# Patient Record
Sex: FEMALE | Race: WHITE | Employment: FULL TIME | URBAN - METROPOLITAN AREA
[De-identification: names, ages, dates, MRNs, and addresses within clinical notes are randomized per-mention and may not be internally consistent; named-entity substitution may affect disease eponyms.]

---

## 2017-05-24 PROCEDURE — 88175 CYTOPATH C/V AUTO FLUID REDO: CPT | Performed by: OBSTETRICS & GYNECOLOGY

## 2017-05-24 PROCEDURE — 87624 HPV HI-RISK TYP POOLED RSLT: CPT | Performed by: OBSTETRICS & GYNECOLOGY

## 2017-08-13 ENCOUNTER — HOSPITAL ENCOUNTER (OUTPATIENT)
Age: 47
Discharge: HOME | End: 2017-08-13
Payer: COMMERCIAL

## 2017-08-13 VITALS
BODY MASS INDEX: 35.97 KG/M2 | HEIGHT: 61.5 IN | OXYGEN SATURATION: 99 % | WEIGHT: 193 LBS | RESPIRATION RATE: 18 BRPM | TEMPERATURE: 99 F | DIASTOLIC BLOOD PRESSURE: 95 MMHG | SYSTOLIC BLOOD PRESSURE: 149 MMHG | HEART RATE: 92 BPM

## 2017-08-13 DIAGNOSIS — M79.2 RADICULAR PAIN OF RIGHT UPPER EXTREMITY: ICD-10-CM

## 2017-08-13 DIAGNOSIS — S16.1XXA CERVICAL MUSCLE STRAIN, INITIAL ENCOUNTER: Primary | ICD-10-CM

## 2017-08-13 PROCEDURE — 96372 THER/PROPH/DIAG INJ SC/IM: CPT

## 2017-08-13 PROCEDURE — 99214 OFFICE O/P EST MOD 30 MIN: CPT

## 2017-08-13 PROCEDURE — 99204 OFFICE O/P NEW MOD 45 MIN: CPT

## 2017-08-13 RX ORDER — KETOROLAC TROMETHAMINE 30 MG/ML
60 INJECTION, SOLUTION INTRAMUSCULAR; INTRAVENOUS ONCE
Status: COMPLETED | OUTPATIENT
Start: 2017-08-13 | End: 2017-08-13

## 2017-08-13 RX ORDER — HYDROCODONE BITARTRATE AND ACETAMINOPHEN 5; 325 MG/1; MG/1
1 TABLET ORAL EVERY 6 HOURS PRN
Qty: 17 TABLET | Refills: 0 | Status: SHIPPED | OUTPATIENT
Start: 2017-08-13 | End: 2017-12-23 | Stop reason: ALTCHOICE

## 2017-08-13 RX ORDER — CYCLOBENZAPRINE HCL 5 MG
5 TABLET ORAL 3 TIMES DAILY PRN
Qty: 20 TABLET | Refills: 0 | Status: SHIPPED | OUTPATIENT
Start: 2017-08-13 | End: 2017-12-23 | Stop reason: ALTCHOICE

## 2017-08-13 RX ORDER — METHYLPREDNISOLONE 4 MG/1
TABLET ORAL
Qty: 1 PACKAGE | Refills: 0 | Status: SHIPPED | OUTPATIENT
Start: 2017-08-13 | End: 2017-12-23 | Stop reason: ALTCHOICE

## 2017-08-13 NOTE — ED NOTES
Pt instructed that she could take tylenol if she wasn't taking norco, she will check her B/P when calmer, and go to ED if headache worsens

## 2017-08-13 NOTE — ED PROVIDER NOTES
Patient Seen in: [facility]    History   Patient presents with:  Shoulder Pain    Stated Complaint: PINCHED NERVE/RT SHLDR, ARM PAIN X 3 DAYS    HPI    Glenndelmar Shipley is a 53-year-old female who comes in today complaining of axial neck Skin cancer   • Cancer Mother      Lung Cancer   • Hypertension Father    • High Cholesterol Paternal Grandmother    • Osteoporosis[Other] [OTHER] Mother    • Osteoporosis[Other] [OTHER] Maternal Grandmother    • Dementia Maternal Grandmother    • Breast and intact distal pulses. Pulmonary/Chest: Effort normal and breath sounds normal. No respiratory distress. She has no wheezes. She has no rales.    Musculoskeletal:        Right shoulder: Normal.        Cervical back: She exhibits decreased range of mot pain relief to start the Medrol Dosepak tomorrow with Flexeril and Norco as needed for pain if any numbness tingling bowel or bladder incontinence she should be reevaluated immediately    The patient is in good condition throughout her visit today and kai instructions and plan.

## 2017-08-13 NOTE — ED INITIAL ASSESSMENT (HPI)
Right shoulder pain x 3 days. Denies injury. Pt states hse woke with the pain radiating to elbow. Able to lift arm up to shoulder level. Took motrin 1-2 tabs as needed. Also applied icy hot. Denies nay swelling.

## 2017-08-13 NOTE — ED NOTES
Pt is getting a headache, and was wondering is it was normal, or her B/P. Pt denies any other symptoms.   She rates her headache as a 2/10

## 2017-12-17 ENCOUNTER — HOSPITAL ENCOUNTER (OUTPATIENT)
Age: 47
Discharge: ANOTHER HOSPITAL | End: 2017-12-17
Attending: FAMILY MEDICINE
Payer: COMMERCIAL

## 2017-12-17 ENCOUNTER — HOSPITAL ENCOUNTER (EMERGENCY)
Facility: HOSPITAL | Age: 47
Discharge: STILL ADMITTED OR ADMINISTRATIVE | End: 2017-12-19
Payer: COMMERCIAL

## 2017-12-17 VITALS
OXYGEN SATURATION: 100 % | SYSTOLIC BLOOD PRESSURE: 141 MMHG | TEMPERATURE: 98 F | HEART RATE: 80 BPM | RESPIRATION RATE: 16 BRPM | DIASTOLIC BLOOD PRESSURE: 80 MMHG

## 2017-12-17 DIAGNOSIS — M54.9 PAIN RADIATING TO BACK: ICD-10-CM

## 2017-12-17 DIAGNOSIS — R10.32 ABDOMINAL PAIN, LEFT LOWER QUADRANT: Primary | ICD-10-CM

## 2017-12-17 LAB
POCT BILIRUBIN URINE: NEGATIVE
POCT GLUCOSE URINE: NEGATIVE MG/DL
POCT KETONE URINE: 80 MG/DL
POCT NITRITE URINE: NEGATIVE
POCT PH URINE: 7.5 (ref 5–8)
POCT SPECIFIC GRAVITY URINE: 1.02
POCT URINE COLOR: YELLOW
POCT URINE PREGNANCY: NEGATIVE
POCT UROBILINOGEN URINE: 0.2 MG/DL

## 2017-12-17 PROCEDURE — 87077 CULTURE AEROBIC IDENTIFY: CPT | Performed by: FAMILY MEDICINE

## 2017-12-17 PROCEDURE — 81025 URINE PREGNANCY TEST: CPT | Performed by: FAMILY MEDICINE

## 2017-12-17 PROCEDURE — 87186 SC STD MICRODIL/AGAR DIL: CPT | Performed by: FAMILY MEDICINE

## 2017-12-17 PROCEDURE — 87086 URINE CULTURE/COLONY COUNT: CPT | Performed by: FAMILY MEDICINE

## 2017-12-17 PROCEDURE — 99215 OFFICE O/P EST HI 40 MIN: CPT

## 2017-12-17 PROCEDURE — 81002 URINALYSIS NONAUTO W/O SCOPE: CPT | Performed by: FAMILY MEDICINE

## 2017-12-17 RX ORDER — SODIUM CHLORIDE 9 MG/ML
1000 INJECTION, SOLUTION INTRAVENOUS ONCE
Status: COMPLETED | OUTPATIENT
Start: 2017-12-17 | End: 2017-12-17

## 2017-12-17 RX ORDER — ONDANSETRON 4 MG/1
4 TABLET, ORALLY DISINTEGRATING ORAL ONCE
Status: COMPLETED | OUTPATIENT
Start: 2017-12-17 | End: 2017-12-17

## 2017-12-17 RX ORDER — ONDANSETRON 4 MG/1
4 TABLET, ORALLY DISINTEGRATING ORAL ONCE
Status: DISCONTINUED | OUTPATIENT
Start: 2017-12-17 | End: 2017-12-17

## 2017-12-17 RX ORDER — SODIUM CHLORIDE 9 MG/ML
1000 INJECTION, SOLUTION INTRAVENOUS ONCE
Status: DISCONTINUED | OUTPATIENT
Start: 2017-12-17 | End: 2017-12-17

## 2017-12-17 NOTE — ED PROVIDER NOTES
Patient Seen in: [facility]    History   Patient presents with:  Abdominal Pain  Nausea    Stated Complaint: EXTREME abdominal pain radiating to back, today    HPI  Patient is a 18-year-old female coming in with complaints of ex 1615]  BP: 148/80  Pulse: 73  Resp: 16  Temp: 98 °F (36.7 °C)  Temp src: Oral  SpO2: 100 %  O2 Device: None (Room air)    Current:/80   Pulse 73   Temp 98 °F (36.7 °C) (Oral)   Resp 16   LMP 12/06/2017 (Exact Date)   SpO2 100%       Physical Exam  GE Go to ER now, do not stop  Remain n.p.o., avoid any consumption of food or liquids.    Transfer to the ER for further evaluation and higher level of care   IV access obtained - Fluids started   Zofran 4 mg ODT given while here in IC         Disposit

## 2017-12-17 NOTE — ED INITIAL ASSESSMENT (HPI)
Patient presents with left lower abdominal pain all day today. Pain is constant sharp in nature. No fever noted. Some nausea but not emesis. Small BM today

## 2017-12-23 PROBLEM — N20.0 KIDNEY STONE: Status: ACTIVE | Noted: 2017-12-23

## 2018-01-21 PROCEDURE — 87147 CULTURE TYPE IMMUNOLOGIC: CPT | Performed by: INTERNAL MEDICINE

## 2018-01-21 PROCEDURE — 87077 CULTURE AEROBIC IDENTIFY: CPT | Performed by: INTERNAL MEDICINE

## 2018-01-21 PROCEDURE — 87086 URINE CULTURE/COLONY COUNT: CPT | Performed by: INTERNAL MEDICINE

## 2018-01-26 PROCEDURE — 87086 URINE CULTURE/COLONY COUNT: CPT | Performed by: OBSTETRICS & GYNECOLOGY

## 2018-06-28 PROCEDURE — 87086 URINE CULTURE/COLONY COUNT: CPT | Performed by: INTERNAL MEDICINE

## 2018-06-28 PROCEDURE — 87077 CULTURE AEROBIC IDENTIFY: CPT | Performed by: INTERNAL MEDICINE

## 2018-06-28 PROCEDURE — 87186 SC STD MICRODIL/AGAR DIL: CPT | Performed by: INTERNAL MEDICINE

## 2018-07-21 LAB — HEPATITIS B SURFACE$ANTIBODY (QUANT): <5 MIU/ML

## 2019-11-18 PROBLEM — E66.811 CLASS 1 OBESITY DUE TO EXCESS CALORIES WITHOUT SERIOUS COMORBIDITY WITH BODY MASS INDEX (BMI) OF 33.0 TO 33.9 IN ADULT: Status: ACTIVE | Noted: 2019-11-18

## 2019-11-18 PROBLEM — E66.09 CLASS 1 OBESITY DUE TO EXCESS CALORIES WITHOUT SERIOUS COMORBIDITY WITH BODY MASS INDEX (BMI) OF 33.0 TO 33.9 IN ADULT: Status: ACTIVE | Noted: 2019-11-18

## 2021-02-01 ENCOUNTER — MED REC SCAN ONLY (OUTPATIENT)
Dept: ORTHOPEDICS CLINIC | Facility: CLINIC | Age: 51
End: 2021-02-01

## 2021-02-12 ENCOUNTER — HOSPITAL ENCOUNTER (EMERGENCY)
Facility: HOSPITAL | Age: 51
Discharge: HOME | End: 2021-02-13
Attending: EMERGENCY MEDICINE
Payer: COMMERCIAL

## 2021-02-12 ENCOUNTER — APPOINTMENT (OUTPATIENT)
Dept: GENERAL RADIOLOGY | Facility: HOSPITAL | Age: 51
End: 2021-02-12
Attending: EMERGENCY MEDICINE
Payer: COMMERCIAL

## 2021-02-12 DIAGNOSIS — S43.014A ANTERIOR DISLOCATION OF RIGHT SHOULDER, INITIAL ENCOUNTER: ICD-10-CM

## 2021-02-12 DIAGNOSIS — R11.2 NAUSEA AND VOMITING IN ADULT: ICD-10-CM
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
## 2021-02-12 DIAGNOSIS — M25.511 ACUTE PAIN OF RIGHT SHOULDER: ICD-10-CM

## 2021-02-12 PROCEDURE — 96375 TX/PRO/DX INJ NEW DRUG ADDON: CPT

## 2021-02-12 PROCEDURE — 23650 CLTX SHO DSLC W/MNPJ WO ANES: CPT

## 2021-02-12 PROCEDURE — 96374 THER/PROPH/DIAG INJ IV PUSH: CPT

## 2021-02-12 PROCEDURE — 99285 EMERGENCY DEPT VISIT HI MDM: CPT

## 2021-02-12 PROCEDURE — 73060 X-RAY EXAM OF HUMERUS: CPT | Performed by: EMERGENCY MEDICINE

## 2021-02-12 PROCEDURE — 96376 TX/PRO/DX INJ SAME DRUG ADON: CPT

## 2021-02-12 RX ORDER — HYDROMORPHONE HYDROCHLORIDE 1 MG/ML
0.5 INJECTION, SOLUTION INTRAMUSCULAR; INTRAVENOUS; SUBCUTANEOUS ONCE
Status: DISCONTINUED | OUTPATIENT
Start: 2021-02-12 | End: 2021-02-13

## 2021-02-12 RX ORDER — HYDROMORPHONE HYDROCHLORIDE 1 MG/ML
INJECTION, SOLUTION INTRAMUSCULAR; INTRAVENOUS; SUBCUTANEOUS
Status: DISCONTINUED
Start: 2021-02-12 | End: 2021-02-13

## 2021-02-12 RX ORDER — HYDROMORPHONE HYDROCHLORIDE 1 MG/ML
1 INJECTION, SOLUTION INTRAMUSCULAR; INTRAVENOUS; SUBCUTANEOUS ONCE
Status: COMPLETED | OUTPATIENT
Start: 2021-02-12 | End: 2021-02-12

## 2021-02-12 RX ORDER — METHYLPREDNISOLONE 4 MG/1
TABLET ORAL
Qty: 1 PACKAGE | Refills: 0 | Status: CANCELLED
Start: 2021-02-12

## 2021-02-12 RX ORDER — HYDROCODONE BITARTRATE AND ACETAMINOPHEN 5; 325 MG/1; MG/1
1-2 TABLET ORAL EVERY 6 HOURS PRN
Qty: 10 TABLET | Refills: 0 | Status: CANCELLED | OUTPATIENT
Start: 2021-02-12 | End: 2021-02-19

## 2021-02-12 RX ORDER — CYCLOBENZAPRINE HCL 10 MG
10 TABLET ORAL 3 TIMES DAILY PRN
Qty: 20 TABLET | Refills: 0 | Status: CANCELLED | OUTPATIENT
Start: 2021-02-12 | End: 2021-02-19

## 2021-02-12 RX ORDER — ONDANSETRON 2 MG/ML
4 INJECTION INTRAMUSCULAR; INTRAVENOUS ONCE
Status: COMPLETED | OUTPATIENT
Start: 2021-02-12 | End: 2021-02-12

## 2021-02-12 RX ORDER — HYDROMORPHONE HYDROCHLORIDE 1 MG/ML
0.5 INJECTION, SOLUTION INTRAMUSCULAR; INTRAVENOUS; SUBCUTANEOUS ONCE
Status: COMPLETED | OUTPATIENT
Start: 2021-02-12 | End: 2021-02-12

## 2021-02-13 ENCOUNTER — APPOINTMENT (OUTPATIENT)
Dept: GENERAL RADIOLOGY | Facility: HOSPITAL | Age: 51
End: 2021-02-13
Attending: EMERGENCY MEDICINE
Payer: COMMERCIAL

## 2021-02-13 VITALS
SYSTOLIC BLOOD PRESSURE: 137 MMHG | WEIGHT: 180 LBS | HEIGHT: 61 IN | OXYGEN SATURATION: 100 % | TEMPERATURE: 98 F | HEART RATE: 90 BPM | DIASTOLIC BLOOD PRESSURE: 69 MMHG | BODY MASS INDEX: 33.99 KG/M2 | RESPIRATION RATE: 16 BRPM

## 2021-02-13 PROCEDURE — 73030 X-RAY EXAM OF SHOULDER: CPT | Performed by: EMERGENCY MEDICINE

## 2021-02-13 RX ORDER — ONDANSETRON 2 MG/ML
4 INJECTION INTRAMUSCULAR; INTRAVENOUS ONCE
Status: COMPLETED | OUTPATIENT
Start: 2021-02-13 | End: 2021-02-13

## 2021-02-13 RX ORDER — ONDANSETRON 2 MG/ML
INJECTION INTRAMUSCULAR; INTRAVENOUS
Status: COMPLETED
Start: 2021-02-13 | End: 2021-02-13

## 2021-02-13 RX ORDER — METOCLOPRAMIDE HYDROCHLORIDE 5 MG/ML
10 INJECTION INTRAMUSCULAR; INTRAVENOUS ONCE
Status: COMPLETED | OUTPATIENT
Start: 2021-02-13 | End: 2021-02-13

## 2021-02-13 NOTE — ED INITIAL ASSESSMENT (HPI)
Pt states she slipped on a cat toy and fell forward on tile with arms outstretched. Pt c/o pain to right shoulder.

## 2021-02-13 NOTE — ED PROVIDER NOTES
Patient Seen in: [facility] Emergency Department      History   Patient presents with:  Trauma    Stated Complaint: Rt shoulder injury. S/P Fall    HPI/Subjective:   HPI    57-year-old female presents ED with complaints of right shoulder pain.   More Pulse 75   Resp 16   Temp 97.6 °F (36.4 °C)   Temp src Temporal   SpO2 98 %   O2 Device None (Room air)       Current:/69   Pulse 90   Temp 97.6 °F (36.4 °C) (Temporal)   Resp 16   Ht 154.9 cm (5' 1\")   Wt 81.6 kg   LMP 12/24/2020   SpO2 100%   BM are concerning for possible inferior dislocation. however patient's right arm is abducted and flexed at the elbow. She is neurovascularly intact distally in the median ulnar and radial nerve distributions.   Multiple attempts were made to do a shoulder re

## 2021-02-15 ENCOUNTER — OFFICE VISIT (OUTPATIENT)
Dept: ORTHOPEDICS CLINIC | Facility: CLINIC | Age: 51
End: 2021-02-15
Payer: COMMERCIAL

## 2021-02-15 VITALS — HEART RATE: 90 BPM | HEIGHT: 61 IN | RESPIRATION RATE: 16 BRPM | OXYGEN SATURATION: 99 % | BODY MASS INDEX: 34 KG/M2

## 2021-02-15 DIAGNOSIS — M25.311 INSTABILITY OF RIGHT SHOULDER JOINT: Primary | ICD-10-CM

## 2021-02-15 PROCEDURE — 23650 CLTX SHO DSLC W/MNPJ WO ANES: CPT | Performed by: ORTHOPAEDIC SURGERY

## 2021-02-15 PROCEDURE — 99204 OFFICE O/P NEW MOD 45 MIN: CPT | Performed by: ORTHOPAEDIC SURGERY

## 2021-02-15 RX ORDER — IBUPROFEN 600 MG/1
600 TABLET ORAL EVERY 6 HOURS PRN
COMMUNITY
End: 2021-03-30

## 2021-02-15 RX ORDER — MELOXICAM 15 MG/1
15 TABLET ORAL DAILY
Qty: 14 TABLET | Refills: 1 | Status: SHIPPED | OUTPATIENT
Start: 2021-02-15 | End: 2021-03-30

## 2021-02-15 NOTE — H&P
[facility]  [facility] [address] [id number]  [phone]     NEW PATIENT VISIT - HISTORY AND PHYSICAL EXAMINATION     Name: Navi Serrano   MRN: [id number]  Date: 2/15/2021     CC: Right should • High Cholesterol Paternal Grandmother    • Cancer Mother         Lung Cancer   • Other (Osteoporosis) Mother    • Diabetes Maternal Aunt        ALLERGIES:  Patient has no known allergies.     MEDICATIONS:   Current Outpatient Medications   Medication Sig AC Joint  Negative  Biceps Tendon  Negative  Greater Tuberosity Negative    ROM:   Forward Flexion:  150°  Abduction:   Not tested.   External Rotation:  60°  Internal Rotation:  sacrum    Rotator Cuff Strength:   Supraspinatus:   5/5  Subscapularis:   5/5 PROCEDURE:  XR SHOULDER, COMPLETE (MIN 2 VIEWS), RIGHT (CPT=73030)     TECHNIQUE:  Multiple views were obtained. COMPARISON:  None. INDICATIONS:  Rt shoulder injury. S/P Fall  PATIENT STATED HISTORY: (As transcribed by Technologist)  Post reduction.     Geovany Galindo

## 2021-02-17 ENCOUNTER — TELEPHONE (OUTPATIENT)
Dept: ORTHOPEDICS CLINIC | Facility: CLINIC | Age: 51
End: 2021-02-17

## 2021-02-19 ENCOUNTER — TELEPHONE (OUTPATIENT)
Dept: ORTHOPEDICS CLINIC | Facility: CLINIC | Age: 51
End: 2021-02-19

## 2021-02-19 NOTE — TELEPHONE ENCOUNTER
Patient called to get work for note faxed to her employer, However she does not have a fax number. Patient will call later to provide number. Letter will also be mailed to her as she request a copy.

## 2021-03-30 ENCOUNTER — OFFICE VISIT (OUTPATIENT)
Dept: ORTHOPEDICS CLINIC | Facility: CLINIC | Age: 51
End: 2021-03-30
Payer: COMMERCIAL

## 2021-03-30 VITALS — OXYGEN SATURATION: 99 % | HEART RATE: 83 BPM

## 2021-03-30 DIAGNOSIS — M25.311 INSTABILITY OF RIGHT SHOULDER JOINT: Primary | ICD-10-CM

## 2021-03-30 PROCEDURE — 99024 POSTOP FOLLOW-UP VISIT: CPT | Performed by: ORTHOPAEDIC SURGERY

## 2021-03-30 RX ORDER — ITRACONAZOLE 100 MG/1
CAPSULE ORAL
COMMUNITY
Start: 2021-03-08 | End: 2021-07-28 | Stop reason: ALTCHOICE

## 2021-03-30 NOTE — PROGRESS NOTES
[facility]  [address] [facility] [address] [address]       Name: Ruby Mireles   MRN: [id number]  Date: 3/30/2021     REASON FOR VISIT: Follow-up evaluation for right shoulder dislocation u in all rotator cuff muscles in all planes, 5 out of 5. No sensory abnormalities distally. The contralateral upper extremity is without limitation in range of motion or strength, no positive provocative maneuvers.      Radiographic Examination/Diagnostic

## 2021-04-01 ENCOUNTER — MED REC SCAN ONLY (OUTPATIENT)
Dept: ORTHOPEDICS CLINIC | Facility: CLINIC | Age: 51
End: 2021-04-01

## 2021-04-12 ENCOUNTER — APPOINTMENT (OUTPATIENT)
Dept: OTHER | Facility: HOSPITAL | Age: 51
End: 2021-04-12
Attending: PREVENTIVE MEDICINE

## 2021-04-16 ENCOUNTER — PATIENT MESSAGE (OUTPATIENT)
Dept: ORTHOPEDICS CLINIC | Facility: CLINIC | Age: 51
End: 2021-04-16

## 2021-04-16 NOTE — TELEPHONE ENCOUNTER
From: Sumaya Celestin  To: [facility], MD  Sent: 4/16/2021 12:21 PM CDT  Subject: Other    Hi Dr. Kathi Martinez,    I am starting a new job next week and they are asking for a letter from you stating what my restrictions are in terms of how many pounds i can

## 2021-04-21 ENCOUNTER — PATIENT MESSAGE (OUTPATIENT)
Dept: ORTHOPEDICS CLINIC | Facility: CLINIC | Age: 51
End: 2021-04-21

## 2021-04-21 NOTE — TELEPHONE ENCOUNTER
From: Beryl Hernandez  To: [facility], MD  Sent: 4/21/2021 7:17 AM CDT  Subject: Other    Hi Dr. Waylon Yates,    I spoke with Chanelle Calle at PT yesterday and she said that I should not lift anything more than 5lb overhead and  no more than 20lb from the

## 2021-04-28 ENCOUNTER — EMPLOYEE HEALTH (OUTPATIENT)
Dept: OTHER | Facility: HOSPITAL | Age: 51
End: 2021-04-28
Attending: PREVENTIVE MEDICINE

## 2021-04-28 DIAGNOSIS — Z11.1 SCREENING-PULMONARY TB: ICD-10-CM

## 2021-04-28 DIAGNOSIS — Z01.84 IMMUNITY STATUS TESTING: Primary | ICD-10-CM

## 2021-04-28 PROCEDURE — 86787 VARICELLA-ZOSTER ANTIBODY: CPT

## 2021-04-28 PROCEDURE — 86480 TB TEST CELL IMMUN MEASURE: CPT

## 2021-04-28 PROCEDURE — 86735 MUMPS ANTIBODY: CPT

## 2021-04-28 PROCEDURE — 86765 RUBEOLA ANTIBODY: CPT

## 2021-04-28 PROCEDURE — 86762 RUBELLA ANTIBODY: CPT

## 2021-06-09 ENCOUNTER — OFFICE VISIT (OUTPATIENT)
Dept: ORTHOPEDICS CLINIC | Facility: CLINIC | Age: 51
End: 2021-06-09
Payer: COMMERCIAL

## 2021-06-09 DIAGNOSIS — M25.311 INSTABILITY OF RIGHT SHOULDER JOINT: Primary | ICD-10-CM

## 2021-06-09 PROCEDURE — 99213 OFFICE O/P EST LOW 20 MIN: CPT | Performed by: ORTHOPAEDIC SURGERY

## 2021-06-10 NOTE — PROGRESS NOTES
[facility]  [address] [phone]       Name: Jacqueline Cabral   MRN: [id number]  Date: 6/9/2021    REASON FOR VISIT: Follow-up evaluation for right shoulder dislocation und muscles in all planes, 5 out of 5. No sensory abnormalities distally. The contralateral upper extremity is without limitation in range of motion or strength, no positive provocative maneuvers.      Radiographic Examination/Diagnostics:    No imaging obt

## 2021-07-28 ENCOUNTER — OFFICE VISIT (OUTPATIENT)
Dept: PAIN CLINIC | Facility: CLINIC | Age: 51
End: 2021-07-28
Payer: COMMERCIAL

## 2021-07-28 VITALS
HEART RATE: 84 BPM | WEIGHT: 180 LBS | OXYGEN SATURATION: 98 % | DIASTOLIC BLOOD PRESSURE: 90 MMHG | SYSTOLIC BLOOD PRESSURE: 140 MMHG | BODY MASS INDEX: 34 KG/M2 | RESPIRATION RATE: 16 BRPM

## 2021-07-28 DIAGNOSIS — M54.12 CERVICAL RADICULOPATHY: ICD-10-CM

## 2021-07-28 DIAGNOSIS — M54.2 CERVICALGIA: Primary | ICD-10-CM

## 2021-07-28 PROCEDURE — 99243 OFF/OP CNSLTJ NEW/EST LOW 30: CPT | Performed by: ANESTHESIOLOGY

## 2021-07-28 PROCEDURE — 3080F DIAST BP >= 90 MM HG: CPT | Performed by: ANESTHESIOLOGY

## 2021-07-28 PROCEDURE — 3077F SYST BP >= 140 MM HG: CPT | Performed by: ANESTHESIOLOGY

## 2021-07-28 RX ORDER — METHYLPREDNISOLONE 4 MG/1
TABLET ORAL
Qty: 1 EACH | Refills: 0 | Status: SHIPPED | OUTPATIENT
Start: 2021-07-28 | End: 2021-08-02

## 2021-07-28 NOTE — PROGRESS NOTES
HPI/Subjective:   Patient ID: Steven Paredes is a 44year old female.     HPI    History/Other:   Review of Systems  Current Outpatient Medications   Medication Sig Dispense Refill   • itraconazole 100 MG Oral Cap TAKE 2 TABLETS BY MOUTH IN THE MORNING AND 2

## 2021-07-28 NOTE — H&P
Name: Uche Polanco   : 1970   DOS: 2021     Chief complaint: Neck pain    History of present illness:  Uche Polanco is a 44year old right-handed female with a history of prior cervical radiculopathy who presents today for evaluation of recur Breast Cancer Maternal Aunt 48        age at dx 48   • Cancer Father         Multiple Myeloma   • Hypertension Father    • Cancer Maternal Grandmother         Skin cancer   • Dementia Maternal Grandmother    • Other (Osteoporosis) Maternal Grandmother    • multilevel cervical  degenerative disc disease and disc extrusion at C5 and C6. This places right-sided neuroforaminal stenosis. Assessment:  Cervicalgia  (primary encounter diagnosis)  Cervical radiculopathy.       Plan:     The patient is right-handed

## 2021-08-02 ENCOUNTER — MED REC SCAN ONLY (OUTPATIENT)
Dept: ORTHOPEDICS CLINIC | Facility: CLINIC | Age: 51
End: 2021-08-02

## 2021-08-03 ENCOUNTER — TELEPHONE (OUTPATIENT)
Dept: PAIN CLINIC | Facility: CLINIC | Age: 51
End: 2021-08-03

## 2021-08-03 RX ORDER — GABAPENTIN 300 MG/1
300 CAPSULE ORAL NIGHTLY
Qty: 30 CAPSULE | Refills: 0 | Status: SHIPPED | OUTPATIENT
Start: 2021-08-03 | End: 2021-08-16

## 2021-08-03 RX ORDER — METHOCARBAMOL 500 MG/1
500 TABLET, FILM COATED ORAL 3 TIMES DAILY PRN
Qty: 90 TABLET | Refills: 0 | Status: SHIPPED | OUTPATIENT
Start: 2021-08-03 | End: 2021-08-23

## 2021-08-03 NOTE — TELEPHONE ENCOUNTER
Pt reports that pain has not diminished after completing the medrol dose johnathan (last tab taken last night, 8/2/2021). Pt's sleep affected by pain/inability to find a comfortable positions/increased symptoms when lying down. Pt states Dr [facility] indicated additional medications may be necessary and MRI Janis Owens is still pending. D/W Dr ADRIAN GONZALEZ who states following scripts should be sent to her pharmacy:   Diclofenac 50 mg TID, with food   Methocarbamol 500 mg TID PRN   Gabapentin 300 mg at bedtime    Discussed medication recommendations with pt, recommended she start with gabapentin tonight, evaluate response, cautioned pt that it can cause drowsiness, dizziness, or mental fogginess. Advised pt to see if she experiences neg SE's prior to adding methocarbamol. Pt SHYAM. Discussed sending diclofenac to [facility] so that she can  today and initiate at lunch. Pt in agreement. Scripts sent to pharmacies.

## 2021-08-05 NOTE — TELEPHONE ENCOUNTER
Pt reports no improvement with diclofenac and gabapentin 300 mg at bedtime. Pt instructed, per Dr Judye Duverney, to increase gabapentin, titrating to 300 mg TID. Pt provided instructions on how to titrate.

## 2021-08-16 ENCOUNTER — PATIENT MESSAGE (OUTPATIENT)
Dept: PAIN CLINIC | Facility: CLINIC | Age: 51
End: 2021-08-16

## 2021-08-16 DIAGNOSIS — M54.2 CERVICALGIA: Primary | ICD-10-CM

## 2021-08-16 DIAGNOSIS — M54.12 CERVICAL RADICULOPATHY: ICD-10-CM

## 2021-08-16 RX ORDER — GABAPENTIN 300 MG/1
300 CAPSULE ORAL 3 TIMES DAILY
Qty: 90 CAPSULE | Refills: 0 | Status: SHIPPED | OUTPATIENT
Start: 2021-08-16

## 2021-08-16 NOTE — TELEPHONE ENCOUNTER
Medication: gabapentin 300 MG Oral Cap    Date of last refill: 08/03/21      Last office visit: 07/28/21  Due back to clinic per last office note:  na  Date next office visit scheduled:  08/23/21      Last OV note recommendation: Plan:      The patient is

## 2021-08-16 NOTE — TELEPHONE ENCOUNTER
Pt instructed to increase to 300 mg TID with slow titration on 8/5/2021. Rx updated to reflect current instructions.

## 2021-08-16 NOTE — TELEPHONE ENCOUNTER
From: Ana Maria Alberts  To: Iglesia Mai MD  Sent: 8/16/2021 9:26 AM CDT  Subject: Prescription Question    Hi Dr. Jacinto Bran,    I will need a refill on gabapentin please. My pharmacy is the [facility] in [place], [place].     Thank you,  Kristal

## 2021-08-19 ENCOUNTER — HOSPITAL ENCOUNTER (OUTPATIENT)
Dept: MRI IMAGING | Facility: HOSPITAL | Age: 51
Discharge: HOME | End: 2021-08-19
Attending: ANESTHESIOLOGY
Payer: COMMERCIAL

## 2021-08-19 DIAGNOSIS — M54.2 CERVICALGIA: ICD-10-CM

## 2021-08-19 DIAGNOSIS — M54.12 CERVICAL RADICULOPATHY: ICD-10-CM

## 2021-08-19 PROCEDURE — 72141 MRI NECK SPINE W/O DYE: CPT | Performed by: ANESTHESIOLOGY

## 2021-08-23 ENCOUNTER — OFFICE VISIT (OUTPATIENT)
Dept: PAIN CLINIC | Facility: CLINIC | Age: 51
End: 2021-08-23
Payer: COMMERCIAL

## 2021-08-23 VITALS
SYSTOLIC BLOOD PRESSURE: 128 MMHG | WEIGHT: 188 LBS | OXYGEN SATURATION: 95 % | BODY MASS INDEX: 36 KG/M2 | HEART RATE: 75 BPM | DIASTOLIC BLOOD PRESSURE: 78 MMHG

## 2021-08-23 DIAGNOSIS — M50.20 CERVICAL DISC HERNIATION: Primary | ICD-10-CM

## 2021-08-23 DIAGNOSIS — M54.12 CERVICAL RADICULAR PAIN: ICD-10-CM

## 2021-08-23 PROCEDURE — 99214 OFFICE O/P EST MOD 30 MIN: CPT | Performed by: NURSE PRACTITIONER

## 2021-08-23 PROCEDURE — 3074F SYST BP LT 130 MM HG: CPT | Performed by: NURSE PRACTITIONER

## 2021-08-23 PROCEDURE — 3078F DIAST BP <80 MM HG: CPT | Performed by: NURSE PRACTITIONER

## 2021-08-23 NOTE — PROGRESS NOTES
HPI:   Tyrel Vasques presents with complaints of Neck pain radiating to right upper extremity. she was asked to have a cervical MRI when she initially saw Dr. Nathen Woo is here to follow up on the MRI results.    MRI results as below:    PROCEDURE:  MRI SPI  Normal caliber, contour, and signal intensity.                          Impression   CONCLUSION:     1.  At C6-C7 there is diffuse disc osteophyte complex and right parasagittal disc protrusion as well as uncovertebral joint osteophyte on the right. Jessica Grant digits/but she is able to  items/no loss of  strength or dropping things.   She is using the diclofenac 50mg tid and gabapentin was 300mg bid lunch/hs but she noticed swelling in her legs and reduced it back to lunch only when she is having most of N/A    Physical Exam:   Vitals: /78   Pulse 75   Wt 188 lb (85.3 kg)   LMP 06/01/2021   SpO2 95%   BMI 35.52 kg/m²   VAS Pain Score: 4 /10    General Appearance: Well developed, well nourished, normal build, independent body habitus, no apparent phys Imaging & Consults:  None    The patient indicates understanding of these issues and agrees to the plan.     CA#[id number]

## 2021-08-23 NOTE — PROGRESS NOTES
Patient presents in office today with reported pain in cervical spine    Current pain level reported = 4/10    Last reported dose of diclofenac this morning      Narcotic Contract renewal na    Urine Drug screen na

## 2021-08-24 ENCOUNTER — TELEPHONE (OUTPATIENT)
Dept: NEUROLOGY | Facility: CLINIC | Age: 51
End: 2021-08-24

## 2021-08-24 NOTE — TELEPHONE ENCOUNTER
Pt is planning on trying PT first and if wanting to proceed with injection, will call back to schedule.

## 2021-08-24 NOTE — TELEPHONE ENCOUNTER
Prior authorization request completed for: YOVANA   Authorization #No Prior Authorization/Nor Pre Determination is Required   Spoke with Maximo Fay at [address] [place] [address] [phone]  Reference #:[id number]   Time:35:07    Authorization dates: N/A   CPT codes approved:

## 2021-08-24 NOTE — TELEPHONE ENCOUNTER
Question Answer Comment   Anesthesia Type Local    Provider Haley Garber    Procedure Cervical JOSE    Implants No    Medical clearance requested (will send to Pain Navigator) No    Patient has Medicare coverage?  No    Comments (Please list entire procedure name he

## 2021-08-26 ENCOUNTER — PATIENT MESSAGE (OUTPATIENT)
Dept: PAIN CLINIC | Facility: CLINIC | Age: 51
End: 2021-08-26

## 2021-08-26 DIAGNOSIS — M54.2 CERVICALGIA: ICD-10-CM

## 2021-08-26 DIAGNOSIS — M50.20 CERVICAL DISC HERNIATION: Primary | ICD-10-CM

## 2021-08-26 NOTE — TELEPHONE ENCOUNTER
Medication diclofenac 50 MG Oral Tab EC:     Date of last refill: 08/03/21  Last office visit: 08/23/21  Due back to clinic per last office note: na   Date next office visit scheduled:  na        Last OV note recommendation:     Medical Decision Making:

## 2021-08-26 NOTE — TELEPHONE ENCOUNTER
From: Anabel Harris  To: Benita Banegas MD  Sent: 8/26/2021 5:10 AM CDT  Subject: Prescription Question    Good morning, Dr. Naima Cervantes -    I need a refill on my Diclofenac prescription please.  This was originally filled at the [facility] but I was [place]

## 2021-09-19 DIAGNOSIS — M54.2 CERVICALGIA: ICD-10-CM

## 2021-09-19 DIAGNOSIS — M50.20 CERVICAL DISC HERNIATION: ICD-10-CM

## 2021-09-20 ENCOUNTER — PATIENT MESSAGE (OUTPATIENT)
Dept: PAIN CLINIC | Facility: CLINIC | Age: 51
End: 2021-09-20

## 2021-09-20 NOTE — TELEPHONE ENCOUNTER
From: Edilma Chvaez  To: DANTE Jesus  Sent: 9/20/2021 1:58 PM CDT  Subject: Other    Chino Bowers,    Do I need to continue taking the gabapentin and diclofenac?  I don't have pain anymore and I really don't want to continue meds if it's not

## 2022-03-03 PROBLEM — Z17.0 MALIGNANT NEOPLASM OF UPPER-OUTER QUADRANT OF RIGHT BREAST IN FEMALE, ESTROGEN RECEPTOR POSITIVE (HCC): Status: ACTIVE | Noted: 2022-03-03

## 2022-03-03 PROBLEM — Z17.0 MALIGNANT NEOPLASM OF UPPER-OUTER QUADRANT OF RIGHT BREAST IN FEMALE, ESTROGEN RECEPTOR POSITIVE: Status: ACTIVE | Noted: 2022-03-03

## 2022-03-03 PROBLEM — C50.411 MALIGNANT NEOPLASM OF UPPER-OUTER QUADRANT OF RIGHT BREAST IN FEMALE, ESTROGEN RECEPTOR POSITIVE (HCC): Status: ACTIVE | Noted: 2022-03-03

## 2022-03-03 PROBLEM — C50.411 MALIGNANT NEOPLASM OF UPPER-OUTER QUADRANT OF RIGHT BREAST IN FEMALE, ESTROGEN RECEPTOR POSITIVE: Status: ACTIVE | Noted: 2022-03-03

## 2022-03-03 PROBLEM — C50.411 MALIGNANT NEOPLASM OF UPPER-OUTER QUADRANT OF RIGHT BREAST IN FEMALE, ESTROGEN RECEPTOR POSITIVE  (HCC): Status: ACTIVE | Noted: 2022-03-03

## 2022-03-03 PROBLEM — Z17.0 MALIGNANT NEOPLASM OF UPPER-OUTER QUADRANT OF RIGHT BREAST IN FEMALE, ESTROGEN RECEPTOR POSITIVE  (HCC): Status: ACTIVE | Noted: 2022-03-03

## 2022-03-17 ENCOUNTER — GENETICS ENCOUNTER (OUTPATIENT)
Dept: GENETICS | Facility: HOSPITAL | Age: 52
End: 2022-03-17
Attending: GENETIC COUNSELOR, MS
Payer: COMMERCIAL

## 2022-03-17 ENCOUNTER — NURSE ONLY (OUTPATIENT)
Dept: HEMATOLOGY/ONCOLOGY | Facility: HOSPITAL | Age: 52
End: 2022-03-17
Attending: GENETIC COUNSELOR, MS
Payer: COMMERCIAL

## 2022-03-17 DIAGNOSIS — Z17.0 MALIGNANT NEOPLASM OF UPPER-OUTER QUADRANT OF RIGHT BREAST IN FEMALE, ESTROGEN RECEPTOR POSITIVE (HCC): Primary | ICD-10-CM

## 2022-03-17 DIAGNOSIS — C50.411 MALIGNANT NEOPLASM OF UPPER-OUTER QUADRANT OF RIGHT BREAST IN FEMALE, ESTROGEN RECEPTOR POSITIVE (HCC): Primary | ICD-10-CM

## 2022-03-17 DIAGNOSIS — Z80.3 FAMILY HISTORY OF MALIGNANT NEOPLASM OF BREAST: ICD-10-CM

## 2022-03-17 PROCEDURE — 96040 HC GENETIC COUNSELING EA 30 MIN: CPT | Performed by: GENETIC COUNSELOR, MS

## 2022-03-17 PROCEDURE — 36415 COLL VENOUS BLD VENIPUNCTURE: CPT

## 2022-03-28 ENCOUNTER — TELEPHONE (OUTPATIENT)
Dept: GENETICS | Facility: HOSPITAL | Age: 52
End: 2022-03-28

## 2022-03-28 NOTE — TELEPHONE ENCOUNTER
LM for Perla Shetty with NEGATIVE genetic testing results for 9 gene breast focused STAT panel through [facility]. Left my contact information if she had questions. Released results to her through lab's portal and will mail her a copy as well.

## 2022-10-28 ENCOUNTER — IMMUNIZATION (OUTPATIENT)
Dept: LAB | Facility: HOSPITAL | Age: 52
End: 2022-10-28
Attending: PREVENTIVE MEDICINE
Payer: COMMERCIAL

## 2022-10-28 DIAGNOSIS — Z23 NEED FOR VACCINATION: Primary | ICD-10-CM

## 2022-10-28 PROCEDURE — 90471 IMMUNIZATION ADMIN: CPT

## 2022-11-03 ENCOUNTER — HOSPITAL ENCOUNTER (OUTPATIENT)
Age: 52
Discharge: HOME | End: 2022-11-03
Payer: COMMERCIAL

## 2022-11-03 ENCOUNTER — APPOINTMENT (OUTPATIENT)
Dept: GENERAL RADIOLOGY | Age: 52
End: 2022-11-03
Attending: PHYSICIAN ASSISTANT
Payer: COMMERCIAL

## 2022-11-03 VITALS
OXYGEN SATURATION: 98 % | HEIGHT: 60 IN | SYSTOLIC BLOOD PRESSURE: 145 MMHG | RESPIRATION RATE: 18 BRPM | TEMPERATURE: 100 F | BODY MASS INDEX: 39.05 KG/M2 | DIASTOLIC BLOOD PRESSURE: 74 MMHG | HEART RATE: 94 BPM | WEIGHT: 198.88 LBS

## 2022-11-03 DIAGNOSIS — M79.672 FOOT PAIN, LEFT: Primary | ICD-10-CM

## 2022-11-03 PROCEDURE — 99213 OFFICE O/P EST LOW 20 MIN: CPT

## 2022-11-03 PROCEDURE — 73630 X-RAY EXAM OF FOOT: CPT | Performed by: PHYSICIAN ASSISTANT

## 2022-11-03 RX ORDER — IBUPROFEN 600 MG/1
600 TABLET ORAL EVERY 8 HOURS PRN
Qty: 30 TABLET | Refills: 0 | Status: SHIPPED | OUTPATIENT
Start: 2022-11-03 | End: 2022-11-10

## 2022-11-03 RX ORDER — TAMOXIFEN CITRATE 20 MG/1
20 TABLET ORAL DAILY
COMMUNITY
Start: 2022-10-22

## 2022-11-04 NOTE — DISCHARGE INSTRUCTIONS
Wear postop shoe, elevate lower extremity as often as possible ice for 15 minutes at a time 3-4 times a day take anti-inflammatory medications as directed    If calf swelling pain be reevaluated, if persistent pain past 1 week he should be reevaluated by orthopedic or podiatry      The best treatment for minor injuries is R.I.C.E. and NSAID medications. R.I.C.E. = Rest  Ice  Compression  Elevation      Rest: Do not use the injured body part unnecessarily. If this is a lower extremity, do not take long walks, run or do anything that causes increased pain. Gradually progress to your normal activity, using pain as your guide. Ice: Apply cold compresses to the injured area. The area that is injured is inflammed. Inflammation causes warmth, so ice may give some relief. You may ice through a brace or ace wrap. Compression: Compression to the area will help control swelling. An ace wrap is the most simple form of compression. You can also wear a brace. Elevation: Raise the injured extremity above heart level. This will reduce throbbing pain and swelling associated with injures. Prop the injured extremity up with pillows while lying down.

## 2022-11-04 NOTE — ED INITIAL ASSESSMENT (HPI)
C/o left ankle pain for couple of days. With redness, swelling and tenderness since yesterday.  No injury

## 2022-11-05 ENCOUNTER — APPOINTMENT (OUTPATIENT)
Dept: ULTRASOUND IMAGING | Facility: HOSPITAL | Age: 52
End: 2022-11-05
Payer: COMMERCIAL

## 2022-11-05 ENCOUNTER — HOSPITAL ENCOUNTER (EMERGENCY)
Facility: HOSPITAL | Age: 52
Discharge: HOME | End: 2022-11-05
Attending: EMERGENCY MEDICINE
Payer: COMMERCIAL

## 2022-11-05 VITALS
RESPIRATION RATE: 16 BRPM | WEIGHT: 197.56 LBS | DIASTOLIC BLOOD PRESSURE: 91 MMHG | SYSTOLIC BLOOD PRESSURE: 135 MMHG | BODY MASS INDEX: 39 KG/M2 | OXYGEN SATURATION: 97 % | TEMPERATURE: 98 F | HEART RATE: 95 BPM

## 2022-11-05 DIAGNOSIS — S86.812A STRAIN OF CALF MUSCLE, LEFT, INITIAL ENCOUNTER: Primary | ICD-10-CM

## 2022-11-05 PROCEDURE — 93971 EXTREMITY STUDY: CPT

## 2022-11-05 PROCEDURE — 99284 EMERGENCY DEPT VISIT MOD MDM: CPT

## 2022-11-05 NOTE — DISCHARGE INSTRUCTIONS
Apply ice 20 minutes 4 times a day Tylenol or Advil recheck with your doctor in 1 week if no better return if worse

## 2022-11-05 NOTE — ED INITIAL ASSESSMENT (HPI)
Patient to the Er c/o left lower leg swelling and pain. Patient reports no recent travel.  No n/v/d no fevers Patient has been taking motrin with no relief

## 2022-11-18 ENCOUNTER — TELEPHONE (OUTPATIENT)
Dept: ORTHOPEDICS CLINIC | Facility: CLINIC | Age: 52
End: 2022-11-18

## 2022-11-18 NOTE — TELEPHONE ENCOUNTER
Patient is scheduled with Dr. Madison Melendrez for left foot pain. Please advise if imaging is needed.

## 2022-11-21 ENCOUNTER — TELEPHONE (OUTPATIENT)
Dept: ORTHOPEDICS CLINIC | Facility: CLINIC | Age: 52
End: 2022-11-21

## 2022-11-21 NOTE — TELEPHONE ENCOUNTER
LMOM confirming patient wants the appt on 12/06 instead of the appt on 12/21. Imaging was completed for this patient for a Left Foot, but it needs to be reviewed to see if additional imaging is needed. If so, please enter the appropriate RX and let the patient know to come in before their appointment to complete the additional imaging. Thank you!     Future Appointments   Date Time Provider Elodia Astorga   12/6/2022  2:30 PM Yuval Brenda., DPM [facility] [id number]   12/21/2022  2:30 PM Yuval Brenda., DPM [facility]

## 2022-11-25 ENCOUNTER — HOSPITAL ENCOUNTER (EMERGENCY)
Facility: HOSPITAL | Age: 52
Discharge: HOME | End: 2022-11-25
Attending: EMERGENCY MEDICINE
Payer: COMMERCIAL

## 2022-11-25 ENCOUNTER — APPOINTMENT (OUTPATIENT)
Dept: ULTRASOUND IMAGING | Facility: HOSPITAL | Age: 52
End: 2022-11-25
Attending: EMERGENCY MEDICINE
Payer: COMMERCIAL

## 2022-11-25 ENCOUNTER — APPOINTMENT (OUTPATIENT)
Dept: GENERAL RADIOLOGY | Facility: HOSPITAL | Age: 52
End: 2022-11-25
Attending: EMERGENCY MEDICINE
Payer: COMMERCIAL

## 2022-11-25 VITALS
TEMPERATURE: 99 F | WEIGHT: 200 LBS | DIASTOLIC BLOOD PRESSURE: 84 MMHG | SYSTOLIC BLOOD PRESSURE: 155 MMHG | RESPIRATION RATE: 18 BRPM | HEART RATE: 111 BPM | OXYGEN SATURATION: 98 % | BODY MASS INDEX: 39.27 KG/M2 | HEIGHT: 60 IN

## 2022-11-25 DIAGNOSIS — R22.42 LOCALIZED SWELLING OF LEFT LOWER LEG: Primary | ICD-10-CM

## 2022-11-25 DIAGNOSIS — M79.672 FOOT PAIN, LEFT: ICD-10-CM

## 2022-11-25 PROCEDURE — 99284 EMERGENCY DEPT VISIT MOD MDM: CPT

## 2022-11-25 PROCEDURE — 73630 X-RAY EXAM OF FOOT: CPT | Performed by: EMERGENCY MEDICINE

## 2022-11-25 PROCEDURE — 93971 EXTREMITY STUDY: CPT | Performed by: EMERGENCY MEDICINE

## 2022-11-25 NOTE — ED INITIAL ASSESSMENT (HPI)
Pt reports left calf pain. Pt reports she was seen here last week for the same thing and that she has a follow up appointment on 12/6. Pt has ortho shoe on her left foot, pt reports that a few days after wearing ortho shoe her left calf started hurting.

## 2022-12-06 ENCOUNTER — OFFICE VISIT (OUTPATIENT)
Dept: ORTHOPEDICS CLINIC | Facility: CLINIC | Age: 52
End: 2022-12-06
Payer: COMMERCIAL

## 2022-12-06 DIAGNOSIS — M77.32 HEEL SPUR, LEFT: Primary | ICD-10-CM

## 2022-12-06 DIAGNOSIS — M72.2 PLANTAR FASCIITIS: ICD-10-CM

## 2022-12-06 DIAGNOSIS — M79.662 PAIN OF LEFT CALF: ICD-10-CM

## 2022-12-06 DIAGNOSIS — M79.675 TOE PAIN, LEFT: ICD-10-CM

## 2022-12-06 DIAGNOSIS — M77.9 TENDONITIS: ICD-10-CM

## 2022-12-06 PROCEDURE — 99203 OFFICE O/P NEW LOW 30 MIN: CPT | Performed by: PODIATRIST

## 2022-12-23 ENCOUNTER — APPOINTMENT (OUTPATIENT)
Dept: GENERAL RADIOLOGY | Facility: HOSPITAL | Age: 52
DRG: 981 | End: 2022-12-23
Attending: EMERGENCY MEDICINE
Payer: COMMERCIAL

## 2022-12-23 ENCOUNTER — HOSPITAL ENCOUNTER (INPATIENT)
Facility: HOSPITAL | Age: 52
LOS: 10 days | Discharge: HOME | DRG: 981 | End: 2023-01-02
Attending: EMERGENCY MEDICINE | Admitting: HOSPITALIST
Payer: COMMERCIAL

## 2022-12-23 ENCOUNTER — APPOINTMENT (OUTPATIENT)
Dept: CT IMAGING | Facility: HOSPITAL | Age: 52
DRG: 981 | End: 2022-12-23
Attending: EMERGENCY MEDICINE
Payer: COMMERCIAL

## 2022-12-23 DIAGNOSIS — I26.94 MULTIPLE SUBSEGMENTAL PULMONARY EMBOLI WITHOUT ACUTE COR PULMONALE (HCC): Primary | ICD-10-CM

## 2022-12-23 DIAGNOSIS — C18.8 OVERLAPPING MALIGNANT NEOPLASM OF COLON (HCC): ICD-10-CM

## 2022-12-23 DIAGNOSIS — D50.8 OTHER IRON DEFICIENCY ANEMIA: ICD-10-CM

## 2022-12-23 DIAGNOSIS — D49.0 COLON NEOPLASM: ICD-10-CM

## 2022-12-23 LAB
ALBUMIN SERPL-MCNC: 3.7 G/DL (ref 3.4–5)
ALBUMIN/GLOB SERPL: 0.9 {RATIO} (ref 1–2)
ALP LIVER SERPL-CCNC: 52 U/L
ALT SERPL-CCNC: 38 U/L
ANION GAP SERPL CALC-SCNC: 5 MMOL/L (ref 0–18)
APTT PPP: 25.1 SECONDS (ref 23.3–35.6)
AST SERPL-CCNC: 30 U/L (ref 15–37)
ATRIAL RATE: 108 BPM
BASOPHILS # BLD AUTO: 0.02 X10(3) UL (ref 0–0.2)
BASOPHILS NFR BLD AUTO: 0.3 %
BILIRUB SERPL-MCNC: 0.6 MG/DL (ref 0.1–2)
BUN BLD-MCNC: 12 MG/DL (ref 7–18)
CALCIUM BLD-MCNC: 9.1 MG/DL (ref 8.5–10.1)
CHLORIDE SERPL-SCNC: 104 MMOL/L (ref 98–112)
CO2 SERPL-SCNC: 27 MMOL/L (ref 21–32)
CREAT BLD-MCNC: 0.98 MG/DL
D DIMER PPP FEU-MCNC: 9.04 UG/ML FEU (ref ?–0.54)
EOSINOPHIL # BLD AUTO: 0.05 X10(3) UL (ref 0–0.7)
EOSINOPHIL NFR BLD AUTO: 0.8 %
ERYTHROCYTE [DISTWIDTH] IN BLOOD BY AUTOMATED COUNT: 12.1 %
GFR SERPLBLD BASED ON 1.73 SQ M-ARVRAT: 69 ML/MIN/1.73M2 (ref 60–?)
GLOBULIN PLAS-MCNC: 4 G/DL (ref 2.8–4.4)
GLUCOSE BLD-MCNC: 112 MG/DL (ref 70–99)
HCT VFR BLD AUTO: 36.3 %
HGB BLD-MCNC: 11.9 G/DL
IMM GRANULOCYTES # BLD AUTO: 0.01 X10(3) UL (ref 0–1)
IMM GRANULOCYTES NFR BLD: 0.2 %
INR BLD: 1.13 (ref 0.85–1.16)
LYMPHOCYTES # BLD AUTO: 1.11 X10(3) UL (ref 1–4)
LYMPHOCYTES NFR BLD AUTO: 16.8 %
MCH RBC QN AUTO: 28.3 PG (ref 26–34)
MCHC RBC AUTO-ENTMCNC: 32.8 G/DL (ref 31–37)
MCV RBC AUTO: 86.4 FL
MONOCYTES # BLD AUTO: 0.58 X10(3) UL (ref 0.1–1)
MONOCYTES NFR BLD AUTO: 8.8 %
NEUTROPHILS # BLD AUTO: 4.84 X10 (3) UL (ref 1.5–7.7)
NEUTROPHILS # BLD AUTO: 4.84 X10(3) UL (ref 1.5–7.7)
NEUTROPHILS NFR BLD AUTO: 73.1 %
NT-PROBNP SERPL-MCNC: 953 PG/ML (ref ?–125)
OSMOLALITY SERPL CALC.SUM OF ELEC: 283 MOSM/KG (ref 275–295)
P AXIS: 54 DEGREES
P-R INTERVAL: 170 MS
PLATELET # BLD AUTO: 157 10(3)UL (ref 150–450)
POTASSIUM SERPL-SCNC: 3.6 MMOL/L (ref 3.5–5.1)
PROT SERPL-MCNC: 7.7 G/DL (ref 6.4–8.2)
PROTHROMBIN TIME: 14.5 SECONDS (ref 11.6–14.8)
Q-T INTERVAL: 340 MS
QRS DURATION: 84 MS
QTC CALCULATION (BEZET): 455 MS
R AXIS: 22 DEGREES
RBC # BLD AUTO: 4.2 X10(6)UL
SARS-COV-2 RNA RESP QL NAA+PROBE: NOT DETECTED
SODIUM SERPL-SCNC: 136 MMOL/L (ref 136–145)
T AXIS: 27 DEGREES
TROPONIN I HIGH SENSITIVITY: 52 NG/L
VENTRICULAR RATE: 108 BPM
WBC # BLD AUTO: 6.6 X10(3) UL (ref 4–11)

## 2022-12-23 PROCEDURE — 99291 CRITICAL CARE FIRST HOUR: CPT | Performed by: NURSE PRACTITIONER

## 2022-12-23 PROCEDURE — 71275 CT ANGIOGRAPHY CHEST: CPT | Performed by: EMERGENCY MEDICINE

## 2022-12-23 PROCEDURE — 71045 X-RAY EXAM CHEST 1 VIEW: CPT | Performed by: EMERGENCY MEDICINE

## 2022-12-23 RX ORDER — SODIUM CHLORIDE, SODIUM LACTATE, POTASSIUM CHLORIDE, CALCIUM CHLORIDE 600; 310; 30; 20 MG/100ML; MG/100ML; MG/100ML; MG/100ML
INJECTION, SOLUTION INTRAVENOUS CONTINUOUS
Status: DISCONTINUED | OUTPATIENT
Start: 2022-12-23 | End: 2022-12-23

## 2022-12-23 RX ORDER — HYDROCODONE BITARTRATE AND ACETAMINOPHEN 5; 325 MG/1; MG/1
2 TABLET ORAL EVERY 4 HOURS PRN
Status: DISCONTINUED | OUTPATIENT
Start: 2022-12-23 | End: 2022-12-28

## 2022-12-23 RX ORDER — HEPARIN SODIUM AND DEXTROSE 10000; 5 [USP'U]/100ML; G/100ML
18 INJECTION INTRAVENOUS ONCE
Status: COMPLETED | OUTPATIENT
Start: 2022-12-23 | End: 2022-12-24

## 2022-12-23 RX ORDER — SCOLOPAMINE TRANSDERMAL SYSTEM 1 MG/1
1 PATCH, EXTENDED RELEASE TRANSDERMAL ONCE
Status: DISCONTINUED | OUTPATIENT
Start: 2022-12-23 | End: 2022-12-23

## 2022-12-23 RX ORDER — ACETAMINOPHEN 325 MG/1
650 TABLET ORAL EVERY 4 HOURS PRN
Status: DISCONTINUED | OUTPATIENT
Start: 2022-12-23 | End: 2022-12-28

## 2022-12-23 RX ORDER — ACETAMINOPHEN 500 MG
1000 TABLET ORAL ONCE
Status: DISCONTINUED | OUTPATIENT
Start: 2022-12-23 | End: 2022-12-23

## 2022-12-23 RX ORDER — HEPARIN SODIUM 1000 [USP'U]/ML
80 INJECTION, SOLUTION INTRAVENOUS; SUBCUTANEOUS ONCE
Status: COMPLETED | OUTPATIENT
Start: 2022-12-23 | End: 2022-12-23

## 2022-12-23 RX ORDER — LORAZEPAM 2 MG/ML
1 INJECTION INTRAMUSCULAR ONCE
Status: COMPLETED | OUTPATIENT
Start: 2022-12-23 | End: 2022-12-23

## 2022-12-23 RX ORDER — MORPHINE SULFATE 2 MG/ML
2 INJECTION, SOLUTION INTRAMUSCULAR; INTRAVENOUS EVERY 2 HOUR PRN
Status: DISCONTINUED | OUTPATIENT
Start: 2022-12-23 | End: 2022-12-28

## 2022-12-23 RX ORDER — SENNOSIDES 8.6 MG
17.2 TABLET ORAL NIGHTLY PRN
Status: DISCONTINUED | OUTPATIENT
Start: 2022-12-23 | End: 2022-12-28

## 2022-12-23 RX ORDER — HEPARIN SODIUM 5000 [USP'U]/ML
5000 INJECTION, SOLUTION INTRAVENOUS; SUBCUTANEOUS ONCE
Status: DISCONTINUED | OUTPATIENT
Start: 2022-12-23 | End: 2022-12-23

## 2022-12-23 RX ORDER — DICYCLOMINE HYDROCHLORIDE 10 MG/1
10 CAPSULE ORAL 3 TIMES DAILY PRN
COMMUNITY

## 2022-12-23 RX ORDER — TAMOXIFEN CITRATE 10 MG/1
20 TABLET ORAL DAILY
Status: DISCONTINUED | OUTPATIENT
Start: 2022-12-24 | End: 2022-12-24

## 2022-12-23 RX ORDER — MORPHINE SULFATE 2 MG/ML
1 INJECTION, SOLUTION INTRAMUSCULAR; INTRAVENOUS EVERY 2 HOUR PRN
Status: DISCONTINUED | OUTPATIENT
Start: 2022-12-23 | End: 2022-12-28

## 2022-12-23 RX ORDER — HEPARIN SODIUM AND DEXTROSE 10000; 5 [USP'U]/100ML; G/100ML
INJECTION INTRAVENOUS CONTINUOUS
Status: DISCONTINUED | OUTPATIENT
Start: 2022-12-24 | End: 2022-12-24

## 2022-12-23 RX ORDER — METRONIDAZOLE 500 MG/100ML
500 INJECTION, SOLUTION INTRAVENOUS ONCE
Status: DISCONTINUED | OUTPATIENT
Start: 2022-12-23 | End: 2022-12-23

## 2022-12-23 RX ORDER — POLYETHYLENE GLYCOL 3350 17 G/17G
17 POWDER, FOR SOLUTION ORAL DAILY PRN
Status: DISCONTINUED | OUTPATIENT
Start: 2022-12-23 | End: 2022-12-28

## 2022-12-23 RX ORDER — ONDANSETRON 2 MG/ML
4 INJECTION INTRAMUSCULAR; INTRAVENOUS EVERY 6 HOURS PRN
Status: DISCONTINUED | OUTPATIENT
Start: 2022-12-23 | End: 2022-12-28

## 2022-12-23 RX ORDER — MORPHINE SULFATE 4 MG/ML
4 INJECTION, SOLUTION INTRAMUSCULAR; INTRAVENOUS EVERY 2 HOUR PRN
Status: DISCONTINUED | OUTPATIENT
Start: 2022-12-23 | End: 2022-12-28

## 2022-12-23 RX ORDER — BISACODYL 10 MG
10 SUPPOSITORY, RECTAL RECTAL
Status: DISCONTINUED | OUTPATIENT
Start: 2022-12-23 | End: 2022-12-28

## 2022-12-23 RX ORDER — HYDROMORPHONE HYDROCHLORIDE 1 MG/ML
0.5 INJECTION, SOLUTION INTRAMUSCULAR; INTRAVENOUS; SUBCUTANEOUS EVERY 30 MIN PRN
Status: DISCONTINUED | OUTPATIENT
Start: 2022-12-23 | End: 2022-12-23

## 2022-12-23 RX ORDER — PROCHLORPERAZINE EDISYLATE 5 MG/ML
5 INJECTION INTRAMUSCULAR; INTRAVENOUS EVERY 8 HOURS PRN
Status: DISCONTINUED | OUTPATIENT
Start: 2022-12-23 | End: 2022-12-28

## 2022-12-23 RX ORDER — HYDROCODONE BITARTRATE AND ACETAMINOPHEN 5; 325 MG/1; MG/1
1 TABLET ORAL EVERY 4 HOURS PRN
Status: DISCONTINUED | OUTPATIENT
Start: 2022-12-23 | End: 2022-12-28

## 2022-12-23 RX ORDER — ONDANSETRON 2 MG/ML
4 INJECTION INTRAMUSCULAR; INTRAVENOUS EVERY 4 HOURS PRN
Status: DISCONTINUED | OUTPATIENT
Start: 2022-12-23 | End: 2022-12-23

## 2022-12-23 NOTE — ED INITIAL ASSESSMENT (HPI)
Pt reports mid right sided back pain with shortness of breath. Pt states she was recently diagnosed with colon CA and is supposed to have surgery on Wednesday. Pt was instructed to come to the ER by her PCP. Denies any hx of blood clots.

## 2022-12-24 ENCOUNTER — APPOINTMENT (OUTPATIENT)
Dept: CV DIAGNOSTICS | Facility: HOSPITAL | Age: 52
DRG: 981 | End: 2022-12-24
Attending: HOSPITALIST
Payer: COMMERCIAL

## 2022-12-24 ENCOUNTER — APPOINTMENT (OUTPATIENT)
Dept: ULTRASOUND IMAGING | Facility: HOSPITAL | Age: 52
DRG: 981 | End: 2022-12-24
Attending: EMERGENCY MEDICINE
Payer: COMMERCIAL

## 2022-12-24 ENCOUNTER — APPOINTMENT (OUTPATIENT)
Dept: INTERVENTIONAL RADIOLOGY/VASCULAR | Facility: HOSPITAL | Age: 52
DRG: 981 | End: 2022-12-24
Attending: INTERNAL MEDICINE
Payer: COMMERCIAL

## 2022-12-24 LAB
ANION GAP SERPL CALC-SCNC: 3 MMOL/L (ref 0–18)
ANTIBODY SCREEN: NEGATIVE
APTT PPP: 104.8 SECONDS (ref 23.3–35.6)
APTT PPP: 114.4 SECONDS (ref 23.3–35.6)
BASOPHILS # BLD AUTO: 0.01 X10(3) UL (ref 0–0.2)
BASOPHILS # BLD AUTO: 0.02 X10(3) UL (ref 0–0.2)
BASOPHILS # BLD AUTO: 0.02 X10(3) UL (ref 0–0.2)
BASOPHILS NFR BLD AUTO: 0.2 %
BASOPHILS NFR BLD AUTO: 0.4 %
BASOPHILS NFR BLD AUTO: 0.4 %
BUN BLD-MCNC: 11 MG/DL (ref 7–18)
CALCIUM BLD-MCNC: 8.3 MG/DL (ref 8.5–10.1)
CHLORIDE SERPL-SCNC: 108 MMOL/L (ref 98–112)
CO2 SERPL-SCNC: 29 MMOL/L (ref 21–32)
CREAT BLD-MCNC: 0.78 MG/DL
EOSINOPHIL # BLD AUTO: 0.06 X10(3) UL (ref 0–0.7)
EOSINOPHIL # BLD AUTO: 0.07 X10(3) UL (ref 0–0.7)
EOSINOPHIL # BLD AUTO: 0.1 X10(3) UL (ref 0–0.7)
EOSINOPHIL NFR BLD AUTO: 1.3 %
EOSINOPHIL NFR BLD AUTO: 1.5 %
EOSINOPHIL NFR BLD AUTO: 2 %
ERYTHROCYTE [DISTWIDTH] IN BLOOD BY AUTOMATED COUNT: 12.1 %
ERYTHROCYTE [DISTWIDTH] IN BLOOD BY AUTOMATED COUNT: 12.1 %
ERYTHROCYTE [DISTWIDTH] IN BLOOD BY AUTOMATED COUNT: 12.3 %
GFR SERPLBLD BASED ON 1.73 SQ M-ARVRAT: 90 ML/MIN/1.73M2 (ref 60–?)
GLUCOSE BLD-MCNC: 157 MG/DL (ref 70–99)
HCT VFR BLD AUTO: 31.1 %
HCT VFR BLD AUTO: 31.7 %
HCT VFR BLD AUTO: 34.1 %
HGB BLD-MCNC: 10.1 G/DL
HGB BLD-MCNC: 10.5 G/DL
HGB BLD-MCNC: 10.6 G/DL
HGB BLD-MCNC: 10.7 G/DL
HGB BLD-MCNC: 11.1 G/DL
HGB BLD-MCNC: 9.9 G/DL
IMM GRANULOCYTES # BLD AUTO: 0.01 X10(3) UL (ref 0–1)
IMM GRANULOCYTES # BLD AUTO: 0.01 X10(3) UL (ref 0–1)
IMM GRANULOCYTES # BLD AUTO: 0.02 X10(3) UL (ref 0–1)
IMM GRANULOCYTES NFR BLD: 0.2 %
IMM GRANULOCYTES NFR BLD: 0.2 %
IMM GRANULOCYTES NFR BLD: 0.4 %
LYMPHOCYTES # BLD AUTO: 1.11 X10(3) UL (ref 1–4)
LYMPHOCYTES # BLD AUTO: 1.11 X10(3) UL (ref 1–4)
LYMPHOCYTES # BLD AUTO: 1.17 X10(3) UL (ref 1–4)
LYMPHOCYTES NFR BLD AUTO: 22 %
LYMPHOCYTES NFR BLD AUTO: 24.4 %
LYMPHOCYTES NFR BLD AUTO: 24.7 %
MAGNESIUM SERPL-MCNC: 2.3 MG/DL (ref 1.6–2.6)
MCH RBC QN AUTO: 28 PG (ref 26–34)
MCH RBC QN AUTO: 28.2 PG (ref 26–34)
MCH RBC QN AUTO: 28.8 PG (ref 26–34)
MCHC RBC AUTO-ENTMCNC: 31.8 G/DL (ref 31–37)
MCHC RBC AUTO-ENTMCNC: 31.9 G/DL (ref 31–37)
MCHC RBC AUTO-ENTMCNC: 32.6 G/DL (ref 31–37)
MCV RBC AUTO: 87.9 FL
MCV RBC AUTO: 88.5 FL
MCV RBC AUTO: 88.6 FL
MONOCYTES # BLD AUTO: 0.42 X10(3) UL (ref 0.1–1)
MONOCYTES # BLD AUTO: 0.45 X10(3) UL (ref 0.1–1)
MONOCYTES # BLD AUTO: 0.48 X10(3) UL (ref 0.1–1)
MONOCYTES NFR BLD AUTO: 9.3 %
MONOCYTES NFR BLD AUTO: 9.5 %
MONOCYTES NFR BLD AUTO: 9.5 %
NEUTROPHILS # BLD AUTO: 2.91 X10 (3) UL (ref 1.5–7.7)
NEUTROPHILS # BLD AUTO: 2.91 X10(3) UL (ref 1.5–7.7)
NEUTROPHILS # BLD AUTO: 3.03 X10 (3) UL (ref 1.5–7.7)
NEUTROPHILS # BLD AUTO: 3.03 X10(3) UL (ref 1.5–7.7)
NEUTROPHILS # BLD AUTO: 3.33 X10 (3) UL (ref 1.5–7.7)
NEUTROPHILS # BLD AUTO: 3.33 X10(3) UL (ref 1.5–7.7)
NEUTROPHILS NFR BLD AUTO: 63.9 %
NEUTROPHILS NFR BLD AUTO: 64.2 %
NEUTROPHILS NFR BLD AUTO: 65.9 %
OSMOLALITY SERPL CALC.SUM OF ELEC: 293 MOSM/KG (ref 275–295)
PLATELET # BLD AUTO: 141 10(3)UL (ref 150–450)
PLATELET # BLD AUTO: 141 10(3)UL (ref 150–450)
PLATELET # BLD AUTO: 145 10(3)UL (ref 150–450)
PLATELET # BLD AUTO: 159 10(3)UL (ref 150–450)
POTASSIUM SERPL-SCNC: 3.7 MMOL/L (ref 3.5–5.1)
PROCALCITONIN SERPL-MCNC: <0.05 NG/ML (ref ?–0.16)
RBC # BLD AUTO: 3.54 X10(6)UL
RBC # BLD AUTO: 3.58 X10(6)UL
RBC # BLD AUTO: 3.85 X10(6)UL
RH BLOOD TYPE: POSITIVE
SODIUM SERPL-SCNC: 140 MMOL/L (ref 136–145)
WBC # BLD AUTO: 4.5 X10(3) UL (ref 4–11)
WBC # BLD AUTO: 4.7 X10(3) UL (ref 4–11)
WBC # BLD AUTO: 5.1 X10(3) UL (ref 4–11)

## 2022-12-24 PROCEDURE — 93306 TTE W/DOPPLER COMPLETE: CPT | Performed by: HOSPITALIST

## 2022-12-24 PROCEDURE — 93971 EXTREMITY STUDY: CPT | Performed by: NURSE PRACTITIONER

## 2022-12-24 PROCEDURE — 06H03DZ INSERTION OF INTRALUMINAL DEVICE INTO INFERIOR VENA CAVA, PERCUTANEOUS APPROACH: ICD-10-PCS | Performed by: ANESTHESIOLOGY

## 2022-12-24 RX ORDER — LIDOCAINE HYDROCHLORIDE 10 MG/ML
INJECTION, SOLUTION INFILTRATION; PERINEURAL
Status: COMPLETED
Start: 2022-12-24 | End: 2022-12-24

## 2022-12-24 RX ORDER — SODIUM CHLORIDE 9 MG/ML
INJECTION, SOLUTION INTRAVENOUS CONTINUOUS
Status: DISCONTINUED | OUTPATIENT
Start: 2022-12-24 | End: 2022-12-24

## 2022-12-24 RX ORDER — HEPARIN SODIUM 5000 [USP'U]/ML
INJECTION, SOLUTION INTRAVENOUS; SUBCUTANEOUS
Status: COMPLETED
Start: 2022-12-24 | End: 2022-12-24

## 2022-12-24 NOTE — ED QUICK NOTES
Orders for admission, patient is aware of plan and ready to go upstairs.  Any questions, please call ED RN Aleks at [phone]    Patient Covid vaccination status: Fully vaccinated     COVID Test Ordered in ED: Rapid SARS-CoV-2 by PCR    COVID Suspicion at Admission: No risk with negative rapid    Running Infusions:      Mental Status/LOC at time of transport: alert and oriented x 4    Other pertinent information:   CIWA score: N/A   NIH score:  N/A

## 2022-12-24 NOTE — PLAN OF CARE
Pt a/ox4. Received on 2 L able to transition to RA. Later placed on 1 L for comfort per pt request. 2 unit drop in hgb. Pt on menstration cycle. Pt had BM. While cleaning pt up, unable to tell if stool appeared to contain blood or if there was leakage from cycle. MD aware. Hgb Q8. Later in afternoon, pt reporting urge to have another BM. Before being placed on bedpan, vaginal area thoroughly cleaned to ensure no cross contamination in blood. While cleaning, scant amount of blood via vaginal area. Pt placed on bedpan and immediately BM appeared kinsey blood and watery. MD aware. GI placed on consult. Per GI will sign off with recommendations to consult gen surg and get IVC filter placed. Gen surg consulted. Per gen surg, Q4 Hgb. Pt NPO. Heme contacted who placed order to have IVC filter placed stat. Prior to going down for IVC filter placement, heparin turned off per IR. Heparin to remain off per gen surg, heme, and pulm. Per gen surg, plan to contact heme and converse on whether not heparin will continue being turned off and will keep medical team up to date on plan. Pt still to remain NPO. IVC filter placed per IR who accessed rt fem. No closure device, manual pressure applied. Upon arrival to ICU, dressing clean, dry, and intact. Pt to remain bedrest until 10 pm tonight. Echo done and resulted. Transfer orders initially in this am but canceled due to escalation in acute events. Pt and family updated and agreeable to plan of care. Report endorsed to incoming RN.

## 2022-12-24 NOTE — PLAN OF CARE
Received pt from ER on heparin gtt, on room air, with stable VS. Neuro intact. Denies HA, nausea. Pt desaturates into the mid 80's while sleeping. 2L O2 via NC applied. Pt c/o back pain and some chest pain when coughing. Administered tylenol for pain. POC communicated with pt and pt's spouse. Both verbalized understanding. Problem: Patient/Family Goals  Goal: Patient/Family Long Term Goal  Description: Patient's Long Term Goal: Discharge home     Interventions:  - See additional Care Plan goals for specific interventions  Outcome: Progressing  Goal: Patient/Family Short Term Goal  Description: Patient's Short Term Goal: Therapeutic ptt    Interventions:   - See additional Care Plan goals for specific interventions  Outcome: Progressing     Problem: RESPIRATORY - ADULT  Goal: Achieves optimal ventilation and oxygenation  Description: INTERVENTIONS:  - Assess for changes in respiratory status  - Assess for changes in mentation and behavior  - Position to facilitate oxygenation and minimize respiratory effort  - Oxygen supplementation based on oxygen saturation or ABGs  - Provide Smoking Cessation handout, if applicable  - Encourage broncho-pulmonary hygiene including cough, deep breathe, Incentive Spirometry  - Assess the need for suctioning and perform as needed  - Assess and instruct to report SOB or any respiratory difficulty  - Respiratory Therapy support as indicated  - Manage/alleviate anxiety  - Monitor for signs/symptoms of CO2 retention  Outcome: Progressing     Problem: SAFETY ADULT - FALL  Goal: Free from fall injury  Description: INTERVENTIONS:  - Assess pt frequently for physical needs  - Identify cognitive and physical deficits and behaviors that affect risk of falls.   - [place] fall precautions as indicated by assessment.  - Educate pt/family on patient safety including physical limitations  - Instruct pt to call for assistance with activity based on assessment  - Modify environment to reduce risk of injury  - Provide assistive devices as appropriate  - Consider OT/PT consult to assist with strengthening/mobility  - Encourage toileting schedule  Outcome: Progressing     Problem: HEMATOLOGIC - ADULT  Goal: Free from bleeding injury  Description: (Example usage: patient with low platelets)  INTERVENTIONS:  - Avoid intramuscular injections, enemas and rectal medication administration  - Ensure safe mobilization of patient  - Hold pressure on venipuncture sites to achieve adequate hemostasis  - Assess for signs and symptoms of internal bleeding  - Monitor lab trends  - Patient is to report abnormal signs of bleeding to staff  - Avoid use of toothpicks and dental floss  - Use electric shaver for shaving  - Use soft bristle tooth brush  - Limit straining and forceful nose blowing  Outcome: Progressing

## 2022-12-25 LAB
APTT PPP: 93.7 SECONDS (ref 23.3–35.6)
BASOPHILS # BLD AUTO: 0.01 X10(3) UL (ref 0–0.2)
BASOPHILS # BLD AUTO: 0.02 X10(3) UL (ref 0–0.2)
BASOPHILS NFR BLD AUTO: 0.2 %
BASOPHILS NFR BLD AUTO: 0.4 %
C DIFF TOX B STL QL: NEGATIVE
EOSINOPHIL # BLD AUTO: 0.05 X10(3) UL (ref 0–0.7)
EOSINOPHIL # BLD AUTO: 0.09 X10(3) UL (ref 0–0.7)
EOSINOPHIL NFR BLD AUTO: 1 %
EOSINOPHIL NFR BLD AUTO: 1.9 %
ERYTHROCYTE [DISTWIDTH] IN BLOOD BY AUTOMATED COUNT: 12.1 %
ERYTHROCYTE [DISTWIDTH] IN BLOOD BY AUTOMATED COUNT: 12.3 %
HCT VFR BLD AUTO: 33 %
HCT VFR BLD AUTO: 34.8 %
HGB BLD-MCNC: 10.2 G/DL
HGB BLD-MCNC: 10.4 G/DL
HGB BLD-MCNC: 10.5 G/DL
HGB BLD-MCNC: 11 G/DL
IMM GRANULOCYTES # BLD AUTO: 0.01 X10(3) UL (ref 0–1)
IMM GRANULOCYTES # BLD AUTO: 0.01 X10(3) UL (ref 0–1)
IMM GRANULOCYTES NFR BLD: 0.2 %
IMM GRANULOCYTES NFR BLD: 0.2 %
LYMPHOCYTES # BLD AUTO: 0.9 X10(3) UL (ref 1–4)
LYMPHOCYTES # BLD AUTO: 0.95 X10(3) UL (ref 1–4)
LYMPHOCYTES NFR BLD AUTO: 18.6 %
LYMPHOCYTES NFR BLD AUTO: 20 %
MCH RBC QN AUTO: 28 PG (ref 26–34)
MCH RBC QN AUTO: 28.2 PG (ref 26–34)
MCHC RBC AUTO-ENTMCNC: 31.6 G/DL (ref 31–37)
MCHC RBC AUTO-ENTMCNC: 31.8 G/DL (ref 31–37)
MCV RBC AUTO: 88.5 FL
MCV RBC AUTO: 88.5 FL
MONOCYTES # BLD AUTO: 0.36 X10(3) UL (ref 0.1–1)
MONOCYTES # BLD AUTO: 0.4 X10(3) UL (ref 0.1–1)
MONOCYTES NFR BLD AUTO: 7.4 %
MONOCYTES NFR BLD AUTO: 8.4 %
NEUTROPHILS # BLD AUTO: 3.29 X10 (3) UL (ref 1.5–7.7)
NEUTROPHILS # BLD AUTO: 3.29 X10(3) UL (ref 1.5–7.7)
NEUTROPHILS # BLD AUTO: 3.51 X10 (3) UL (ref 1.5–7.7)
NEUTROPHILS # BLD AUTO: 3.51 X10(3) UL (ref 1.5–7.7)
NEUTROPHILS NFR BLD AUTO: 69.3 %
NEUTROPHILS NFR BLD AUTO: 72.4 %
PLATELET # BLD AUTO: 134 10(3)UL (ref 150–450)
PLATELET # BLD AUTO: 146 10(3)UL (ref 150–450)
PLATELET # BLD AUTO: 153 10(3)UL (ref 150–450)
RBC # BLD AUTO: 3.73 X10(6)UL
RBC # BLD AUTO: 3.93 X10(6)UL
WBC # BLD AUTO: 4.8 X10(3) UL (ref 4–11)
WBC # BLD AUTO: 4.9 X10(3) UL (ref 4–11)

## 2022-12-25 RX ORDER — HEPARIN SODIUM AND DEXTROSE 10000; 5 [USP'U]/100ML; G/100ML
18 INJECTION INTRAVENOUS ONCE
Status: COMPLETED | OUTPATIENT
Start: 2022-12-25 | End: 2022-12-25

## 2022-12-25 NOTE — PROCEDURES
[facility]  Pre-Procedure Note    Name: Jeronimo Robertson  MRN#: [id number]  : 1970    Procedure:  IVC filter    Indication: PE, LLE DVT. GI bleeding after heparin started. Known colon cancer. Preop for colon resection. Allergies:    No Known Allergies    Pertinent Medications:    Is patient on any Aspirin, Coumadin, or any other Anticoagulations/Antiplatelet medications?  no   Heparin turned off. Mental Status:  Alert and Oriented      Health Status: Acceptable for Procedure    Impression and Plans:    PE/DVT and GI bleeding and preop for colon cancer surgery requiring an IVC filter placement. Will place a retrievable filter in anticipation of removing the filter when no longer needed. I have reviewed the above information prior to procedure. I have discussed the risks and benefits and alternatives with the patient. The patient understands and agrees to proceed with plan of care.     Renée Espinoza MD

## 2022-12-25 NOTE — PROCEDURES
[address] [facility] Patient Status:  Inpatient    1970 MRN [id number]   Location [address] [facility] Attending Mohan Bhardwaj, *   Hosp Day # 1 PCP Felisa Augustin DO         Brief Procedure Report    Pre-Operative Diagnosis: PE/DVT and GI bleeding    Post-Operative Diagnosis: Same as above. Procedure Performed: IVC filter    Anesthesia: 1% lidocaine    EBL: <3VW    Complications: None    Summary of Case: IVC gram from right transfemoral approach is wnl. Bard Zamzam Retrievable filter placed in infrarenal IVC. Patient tolerated procedure well without immediate complication. Full report to follow in PACS.     Lula Miller

## 2022-12-25 NOTE — PLAN OF CARE
Hgb and vital signs remains stable overnight. Pt on 1L NC, no BM overnight. Scant amount of menses. Continue to monitor closely. Problem: Patient/Family Goals  Goal: Patient/Family Long Term Goal  Description: Patient's Long Term Goal: discharge home     Interventions:  - See additional Care Plan goals for specific interventions  Outcome: Progressing  Goal: Patient/Family Short Term Goal  Description: Patient's Short Term Goal: resolve PE    Interventions:   - See additional Care Plan goals for specific interventions  Outcome: Progressing     Problem: RESPIRATORY - ADULT  Goal: Achieves optimal ventilation and oxygenation  Description: INTERVENTIONS:  - Assess for changes in respiratory status  - Assess for changes in mentation and behavior  - Position to facilitate oxygenation and minimize respiratory effort  - Oxygen supplementation based on oxygen saturation or ABGs  - Provide Smoking Cessation handout, if applicable  - Encourage broncho-pulmonary hygiene including cough, deep breathe, Incentive Spirometry  - Assess the need for suctioning and perform as needed  - Assess and instruct to report SOB or any respiratory difficulty  - Respiratory Therapy support as indicated  - Manage/alleviate anxiety  - Monitor for signs/symptoms of CO2 retention  Outcome: Progressing     Problem: SAFETY ADULT - FALL  Goal: Free from fall injury  Description: INTERVENTIONS:  - Assess pt frequently for physical needs  - Identify cognitive and physical deficits and behaviors that affect risk of falls.   - [place] fall precautions as indicated by assessment.  - Educate pt/family on patient safety including physical limitations  - Instruct pt to call for assistance with activity based on assessment  - Modify environment to reduce risk of injury  - Provide assistive devices as appropriate  - Consider OT/PT consult to assist with strengthening/mobility  - Encourage toileting schedule  Outcome: Progressing     Problem: HEMATOLOGIC - ADULT  Goal: Free from bleeding injury  Description: (Example usage: patient with low platelets)  INTERVENTIONS:  - Avoid intramuscular injections, enemas and rectal medication administration  - Ensure safe mobilization of patient  - Hold pressure on venipuncture sites to achieve adequate hemostasis  - Assess for signs and symptoms of internal bleeding  - Monitor lab trends  - Patient is to report abnormal signs of bleeding to staff  - Avoid use of toothpicks and dental floss  - Use electric shaver for shaving  - Use soft bristle tooth brush  - Limit straining and forceful nose blowing  Outcome: Progressing

## 2022-12-25 NOTE — PROGRESS NOTES
Assumed care of patient post report from night shift RN. Patient A/O x4, breathing unlabored on 1 L NC. Patient states she is a little nervous and frustrated because \"we dont have a plan yet\"    Hem/onc, surgery, cards, hosp, GI and pulm have been bedside to discuss plan. Team continues to discuss if heparin drip should be restarted vs. Surgery sooner then later. Surgery and GI to discuss and make finial determination with team by afternoon. 1 BM (sent for c-diff per protocol d/t watery stool charted last shift) c-diff negative. Stool pink/maroon soft, medium.  1 unmeasured urine yellow, clear,     Started heparin drip at 1500

## 2022-12-26 LAB
ALBUMIN SERPL-MCNC: 2.8 G/DL (ref 3.4–5)
ALBUMIN/GLOB SERPL: 0.8 {RATIO} (ref 1–2)
ALP LIVER SERPL-CCNC: 44 U/L
ALT SERPL-CCNC: 48 U/L
ANION GAP SERPL CALC-SCNC: 5 MMOL/L (ref 0–18)
APTT PPP: 99 SECONDS (ref 23.3–35.6)
AST SERPL-CCNC: 34 U/L (ref 15–37)
BILIRUB SERPL-MCNC: 0.4 MG/DL (ref 0.1–2)
BUN BLD-MCNC: 9 MG/DL (ref 7–18)
CALCIUM BLD-MCNC: 8.7 MG/DL (ref 8.5–10.1)
CHLORIDE SERPL-SCNC: 106 MMOL/L (ref 98–112)
CO2 SERPL-SCNC: 28 MMOL/L (ref 21–32)
CREAT BLD-MCNC: 0.72 MG/DL
ERYTHROCYTE [DISTWIDTH] IN BLOOD BY AUTOMATED COUNT: 12.2 %
GFR SERPLBLD BASED ON 1.73 SQ M-ARVRAT: 99 ML/MIN/1.73M2 (ref 60–?)
GLOBULIN PLAS-MCNC: 3.7 G/DL (ref 2.8–4.4)
GLUCOSE BLD-MCNC: 105 MG/DL (ref 70–99)
HCT VFR BLD AUTO: 31.8 %
HGB BLD-MCNC: 10.3 G/DL
MCH RBC QN AUTO: 28.3 PG (ref 26–34)
MCHC RBC AUTO-ENTMCNC: 32.4 G/DL (ref 31–37)
MCV RBC AUTO: 87.4 FL
OSMOLALITY SERPL CALC.SUM OF ELEC: 287 MOSM/KG (ref 275–295)
PLATELET # BLD AUTO: 150 10(3)UL (ref 150–450)
PLATELET # BLD AUTO: 150 10(3)UL (ref 150–450)
POTASSIUM SERPL-SCNC: 3.9 MMOL/L (ref 3.5–5.1)
PROT SERPL-MCNC: 6.5 G/DL (ref 6.4–8.2)
RBC # BLD AUTO: 3.64 X10(6)UL
SODIUM SERPL-SCNC: 139 MMOL/L (ref 136–145)
WBC # BLD AUTO: 4.8 X10(3) UL (ref 4–11)

## 2022-12-26 RX ORDER — HEPARIN SODIUM AND DEXTROSE 10000; 5 [USP'U]/100ML; G/100ML
INJECTION INTRAVENOUS
Status: COMPLETED
Start: 2022-12-26 | End: 2022-12-26

## 2022-12-26 RX ORDER — HEPARIN SODIUM AND DEXTROSE 10000; 5 [USP'U]/100ML; G/100ML
INJECTION INTRAVENOUS CONTINUOUS
Status: DISCONTINUED | OUTPATIENT
Start: 2022-12-26 | End: 2022-12-28

## 2022-12-26 RX ORDER — HEPARIN SODIUM AND DEXTROSE 10000; 5 [USP'U]/100ML; G/100ML
18 INJECTION INTRAVENOUS ONCE
Status: DISCONTINUED | OUTPATIENT
Start: 2022-12-26 | End: 2022-12-28

## 2022-12-27 LAB
ANION GAP SERPL CALC-SCNC: 6 MMOL/L (ref 0–18)
APTT PPP: 148.3 SECONDS (ref 23.3–35.6)
APTT PPP: 62.6 SECONDS (ref 23.3–35.6)
BASOPHILS # BLD AUTO: 0.02 X10(3) UL (ref 0–0.2)
BASOPHILS NFR BLD AUTO: 0.5 %
BUN BLD-MCNC: 11 MG/DL (ref 7–18)
CALCIUM BLD-MCNC: 8.9 MG/DL (ref 8.5–10.1)
CHLORIDE SERPL-SCNC: 105 MMOL/L (ref 98–112)
CO2 SERPL-SCNC: 26 MMOL/L (ref 21–32)
CREAT BLD-MCNC: 0.77 MG/DL
EOSINOPHIL # BLD AUTO: 0.12 X10(3) UL (ref 0–0.7)
EOSINOPHIL NFR BLD AUTO: 3 %
ERYTHROCYTE [DISTWIDTH] IN BLOOD BY AUTOMATED COUNT: 12.3 %
GFR SERPLBLD BASED ON 1.73 SQ M-ARVRAT: 92 ML/MIN/1.73M2 (ref 60–?)
GLUCOSE BLD-MCNC: 97 MG/DL (ref 70–99)
HCT VFR BLD AUTO: 34 %
HGB BLD-MCNC: 10.6 G/DL
IMM GRANULOCYTES # BLD AUTO: 0.01 X10(3) UL (ref 0–1)
IMM GRANULOCYTES NFR BLD: 0.2 %
LYMPHOCYTES # BLD AUTO: 1.22 X10(3) UL (ref 1–4)
LYMPHOCYTES NFR BLD AUTO: 30.4 %
MCH RBC QN AUTO: 28.6 PG (ref 26–34)
MCHC RBC AUTO-ENTMCNC: 31.2 G/DL (ref 31–37)
MCV RBC AUTO: 91.6 FL
MONOCYTES # BLD AUTO: 0.33 X10(3) UL (ref 0.1–1)
MONOCYTES NFR BLD AUTO: 8.2 %
NEUTROPHILS # BLD AUTO: 2.31 X10 (3) UL (ref 1.5–7.7)
NEUTROPHILS # BLD AUTO: 2.31 X10(3) UL (ref 1.5–7.7)
NEUTROPHILS NFR BLD AUTO: 57.7 %
OSMOLALITY SERPL CALC.SUM OF ELEC: 283 MOSM/KG (ref 275–295)
PLATELET # BLD AUTO: 185 10(3)UL (ref 150–450)
POTASSIUM SERPL-SCNC: 4.1 MMOL/L (ref 3.5–5.1)
RBC # BLD AUTO: 3.71 X10(6)UL
SODIUM SERPL-SCNC: 137 MMOL/L (ref 136–145)
WBC # BLD AUTO: 4 X10(3) UL (ref 4–11)

## 2022-12-27 RX ORDER — METRONIDAZOLE 250 MG/1
250 TABLET ORAL
Status: COMPLETED | OUTPATIENT
Start: 2022-12-27 | End: 2022-12-28

## 2022-12-27 RX ORDER — METRONIDAZOLE 500 MG/1
500 TABLET ORAL ONCE
Status: DISCONTINUED | OUTPATIENT
Start: 2022-12-27 | End: 2022-12-27

## 2022-12-27 RX ORDER — NEOMYCIN SULFATE 500 MG/1
1000 TABLET ORAL ONCE
Status: DISCONTINUED | OUTPATIENT
Start: 2022-12-27 | End: 2022-12-27

## 2022-12-27 RX ORDER — METRONIDAZOLE 500 MG/100ML
500 INJECTION, SOLUTION INTRAVENOUS
Status: COMPLETED | OUTPATIENT
Start: 2022-12-28 | End: 2022-12-28

## 2022-12-27 NOTE — PLAN OF CARE
Pt received a/o x4. VSS, remained afebrile. Reports shortness of breath on exertion. On heparin drip 1350 units/hr. Bowel prep restarted. Transferred to surgical floor. Plans for surgery tomorrow.      Problem: HEMATOLOGIC - ADULT  Goal: Free from bleeding injury  Description: (Example usage: patient with low platelets)  INTERVENTIONS:  - Avoid intramuscular injections, enemas and rectal medication administration  - Ensure safe mobilization of patient  - Hold pressure on venipuncture sites to achieve adequate hemostasis  - Assess for signs and symptoms of internal bleeding  - Monitor lab trends  - Patient is to report abnormal signs of bleeding to staff  - Avoid use of toothpicks and dental floss  - Use electric shaver for shaving  - Use soft bristle tooth brush  - Limit straining and forceful nose blowing  Outcome: Progressing

## 2022-12-27 NOTE — PLAN OF CARE
Received from ICU via wheelchair in stable condition, has on going bowel prep for upcoming colon resection in a couple days. Latest HBG were 10.3. Has had almost 500 ml of Golytely with bright red return, MD was notified, to hold off with bowel prep for now, will continue to monitor for any bloody stools. Still on heparin drip.  Next PTT in am.   Problem: HEMATOLOGIC - ADULT  Goal: Free from bleeding injury  Description: (Example usage: patient with low platelets)  INTERVENTIONS:  - Avoid intramuscular injections, enemas and rectal medication administration  - Ensure safe mobilization of patient  - Hold pressure on venipuncture sites to achieve adequate hemostasis  - Assess for signs and symptoms of internal bleeding  - Monitor lab trends  - Patient is to report abnormal signs of bleeding to staff  - Avoid use of toothpicks and dental floss  - Use electric shaver for shaving  - Use soft bristle tooth brush  - Limit straining and forceful nose blowing  Outcome: Not Progressing

## 2022-12-27 NOTE — PLAN OF CARE
Patient alert and oriented x4. VSS, afebrile. Denies pain or nausea. Some dyspnea with exertion. Heparin gtt infusing at 16ml/hr, PTT to be drawn this morning. Patient oxygen saturations dipping slightly while asleep, recovers well. No acute events overnight. Fall precautions in place. Call light in reach. Problem: RESPIRATORY - ADULT  Goal: Achieves optimal ventilation and oxygenation  Description: INTERVENTIONS:  - Assess for changes in respiratory status  - Assess for changes in mentation and behavior  - Position to facilitate oxygenation and minimize respiratory effort  - Oxygen supplementation based on oxygen saturation or ABGs  - Provide Smoking Cessation handout, if applicable  - Encourage broncho-pulmonary hygiene including cough, deep breathe, Incentive Spirometry  - Assess the need for suctioning and perform as needed  - Assess and instruct to report SOB or any respiratory difficulty  - Respiratory Therapy support as indicated  - Manage/alleviate anxiety  - Monitor for signs/symptoms of CO2 retention  Outcome: Progressing     Problem: SAFETY ADULT - FALL  Goal: Free from fall injury  Description: INTERVENTIONS:  - Assess pt frequently for physical needs  - Identify cognitive and physical deficits and behaviors that affect risk of falls.   - [place] fall precautions as indicated by assessment.  - Educate pt/family on patient safety including physical limitations  - Instruct pt to call for assistance with activity based on assessment  - Modify environment to reduce risk of injury  - Provide assistive devices as appropriate  - Consider OT/PT consult to assist with strengthening/mobility  - Encourage toileting schedule  Outcome: Progressing     Problem: HEMATOLOGIC - ADULT  Goal: Free from bleeding injury  Description: (Example usage: patient with low platelets)  INTERVENTIONS:  - Avoid intramuscular injections, enemas and rectal medication administration  - Ensure safe mobilization of patient  - Hold pressure on venipuncture sites to achieve adequate hemostasis  - Assess for signs and symptoms of internal bleeding  - Monitor lab trends  - Patient is to report abnormal signs of bleeding to staff  - Avoid use of toothpicks and dental floss  - Use electric shaver for shaving  - Use soft bristle tooth brush  - Limit straining and forceful nose blowing  Outcome: Progressing

## 2022-12-28 ENCOUNTER — ANESTHESIA (OUTPATIENT)
Dept: SURGERY | Facility: HOSPITAL | Age: 52
DRG: 981 | End: 2022-12-28
Payer: COMMERCIAL

## 2022-12-28 ENCOUNTER — ANESTHESIA EVENT (OUTPATIENT)
Dept: SURGERY | Facility: HOSPITAL | Age: 52
DRG: 981 | End: 2022-12-28
Payer: COMMERCIAL

## 2022-12-28 LAB
ANION GAP SERPL CALC-SCNC: 11 MMOL/L (ref 0–18)
APTT PPP: 69.2 SECONDS (ref 23.3–35.6)
APTT PPP: 91.7 SECONDS (ref 23.3–35.6)
BUN BLD-MCNC: 7 MG/DL (ref 7–18)
CALCIUM BLD-MCNC: 8.9 MG/DL (ref 8.5–10.1)
CHLORIDE SERPL-SCNC: 108 MMOL/L (ref 98–112)
CO2 SERPL-SCNC: 20 MMOL/L (ref 21–32)
CREAT BLD-MCNC: 0.65 MG/DL
GFR SERPLBLD BASED ON 1.73 SQ M-ARVRAT: 105 ML/MIN/1.73M2 (ref 60–?)
GLUCOSE BLD-MCNC: 111 MG/DL (ref 70–99)
GLUCOSE BLD-MCNC: 143 MG/DL (ref 70–99)
OSMOLALITY SERPL CALC.SUM OF ELEC: 287 MOSM/KG (ref 275–295)
POTASSIUM SERPL-SCNC: 4.6 MMOL/L (ref 3.5–5.1)
SODIUM SERPL-SCNC: 139 MMOL/L (ref 136–145)

## 2022-12-28 PROCEDURE — 8E0W4CZ ROBOTIC ASSISTED PROCEDURE OF TRUNK REGION, PERCUTANEOUS ENDOSCOPIC APPROACH: ICD-10-PCS | Performed by: SURGERY

## 2022-12-28 PROCEDURE — 0DTL4ZZ RESECTION OF TRANSVERSE COLON, PERCUTANEOUS ENDOSCOPIC APPROACH: ICD-10-PCS | Performed by: SURGERY

## 2022-12-28 RX ORDER — NEOSTIGMINE METHYLSULFATE 1 MG/ML
INJECTION, SOLUTION INTRAVENOUS AS NEEDED
Status: DISCONTINUED | OUTPATIENT
Start: 2022-12-28 | End: 2022-12-28 | Stop reason: SURG

## 2022-12-28 RX ORDER — HYDROMORPHONE HYDROCHLORIDE 1 MG/ML
0.4 INJECTION, SOLUTION INTRAMUSCULAR; INTRAVENOUS; SUBCUTANEOUS EVERY 2 HOUR PRN
Status: DISCONTINUED | OUTPATIENT
Start: 2022-12-28 | End: 2023-01-02

## 2022-12-28 RX ORDER — NALOXONE HYDROCHLORIDE 0.4 MG/ML
80 INJECTION, SOLUTION INTRAMUSCULAR; INTRAVENOUS; SUBCUTANEOUS AS NEEDED
Status: DISCONTINUED | OUTPATIENT
Start: 2022-12-28 | End: 2022-12-28 | Stop reason: HOSPADM

## 2022-12-28 RX ORDER — POLYETHYLENE GLYCOL 3350 17 G/17G
17 POWDER, FOR SOLUTION ORAL DAILY PRN
Status: DISCONTINUED | OUTPATIENT
Start: 2022-12-28 | End: 2023-01-02

## 2022-12-28 RX ORDER — SENNOSIDES 8.6 MG
17.2 TABLET ORAL NIGHTLY PRN
Status: DISCONTINUED | OUTPATIENT
Start: 2022-12-28 | End: 2023-01-02

## 2022-12-28 RX ORDER — MIDAZOLAM HYDROCHLORIDE 1 MG/ML
1 INJECTION INTRAMUSCULAR; INTRAVENOUS EVERY 5 MIN PRN
Status: DISCONTINUED | OUTPATIENT
Start: 2022-12-28 | End: 2022-12-28 | Stop reason: HOSPADM

## 2022-12-28 RX ORDER — FAMOTIDINE 10 MG/ML
20 INJECTION, SOLUTION INTRAVENOUS 2 TIMES DAILY
Status: DISCONTINUED | OUTPATIENT
Start: 2022-12-28 | End: 2023-01-01

## 2022-12-28 RX ORDER — BUPIVACAINE HYDROCHLORIDE 5 MG/ML
INJECTION, SOLUTION EPIDURAL; INTRACAUDAL AS NEEDED
Status: DISCONTINUED | OUTPATIENT
Start: 2022-12-28 | End: 2022-12-28 | Stop reason: HOSPADM

## 2022-12-28 RX ORDER — MAGNESIUM OXIDE 400 MG/1
400 TABLET ORAL DAILY
Status: DISCONTINUED | OUTPATIENT
Start: 2022-12-28 | End: 2023-01-02

## 2022-12-28 RX ORDER — ONDANSETRON 2 MG/ML
4 INJECTION INTRAMUSCULAR; INTRAVENOUS EVERY 6 HOURS PRN
Status: DISCONTINUED | OUTPATIENT
Start: 2022-12-28 | End: 2023-01-02

## 2022-12-28 RX ORDER — MEPERIDINE HYDROCHLORIDE 25 MG/ML
12.5 INJECTION INTRAMUSCULAR; INTRAVENOUS; SUBCUTANEOUS AS NEEDED
Status: DISCONTINUED | OUTPATIENT
Start: 2022-12-28 | End: 2022-12-28 | Stop reason: HOSPADM

## 2022-12-28 RX ORDER — LIDOCAINE HYDROCHLORIDE 10 MG/ML
INJECTION, SOLUTION EPIDURAL; INFILTRATION; INTRACAUDAL; PERINEURAL AS NEEDED
Status: DISCONTINUED | OUTPATIENT
Start: 2022-12-28 | End: 2022-12-28 | Stop reason: SURG

## 2022-12-28 RX ORDER — HYDROMORPHONE HYDROCHLORIDE 1 MG/ML
0.2 INJECTION, SOLUTION INTRAMUSCULAR; INTRAVENOUS; SUBCUTANEOUS EVERY 5 MIN PRN
Status: DISCONTINUED | OUTPATIENT
Start: 2022-12-28 | End: 2022-12-28 | Stop reason: HOSPADM

## 2022-12-28 RX ORDER — SODIUM CHLORIDE, SODIUM LACTATE, POTASSIUM CHLORIDE, CALCIUM CHLORIDE 600; 310; 30; 20 MG/100ML; MG/100ML; MG/100ML; MG/100ML
INJECTION, SOLUTION INTRAVENOUS CONTINUOUS
Status: DISCONTINUED | OUTPATIENT
Start: 2022-12-28 | End: 2022-12-28 | Stop reason: HOSPADM

## 2022-12-28 RX ORDER — DEXTROSE MONOHYDRATE 50 MG/ML
INJECTION, SOLUTION INTRAVENOUS CONTINUOUS PRN
Status: DISCONTINUED | OUTPATIENT
Start: 2022-12-28 | End: 2022-12-28 | Stop reason: SURG

## 2022-12-28 RX ORDER — HYDROMORPHONE HYDROCHLORIDE 1 MG/ML
0.6 INJECTION, SOLUTION INTRAMUSCULAR; INTRAVENOUS; SUBCUTANEOUS EVERY 5 MIN PRN
Status: DISCONTINUED | OUTPATIENT
Start: 2022-12-28 | End: 2022-12-28 | Stop reason: HOSPADM

## 2022-12-28 RX ORDER — ONDANSETRON 2 MG/ML
INJECTION INTRAMUSCULAR; INTRAVENOUS AS NEEDED
Status: DISCONTINUED | OUTPATIENT
Start: 2022-12-28 | End: 2022-12-28 | Stop reason: SURG

## 2022-12-28 RX ORDER — EPHEDRINE SULFATE 50 MG/ML
INJECTION INTRAVENOUS AS NEEDED
Status: DISCONTINUED | OUTPATIENT
Start: 2022-12-28 | End: 2022-12-28 | Stop reason: SURG

## 2022-12-28 RX ORDER — SODIUM CHLORIDE, SODIUM LACTATE, POTASSIUM CHLORIDE, CALCIUM CHLORIDE 600; 310; 30; 20 MG/100ML; MG/100ML; MG/100ML; MG/100ML
1 INJECTION, SOLUTION INTRAVENOUS CONTINUOUS
Status: DISCONTINUED | OUTPATIENT
Start: 2022-12-28 | End: 2023-01-01

## 2022-12-28 RX ORDER — HYDROMORPHONE HYDROCHLORIDE 1 MG/ML
INJECTION, SOLUTION INTRAMUSCULAR; INTRAVENOUS; SUBCUTANEOUS
Status: COMPLETED
Start: 2022-12-28 | End: 2022-12-28

## 2022-12-28 RX ORDER — GLYCOPYRROLATE 0.2 MG/ML
INJECTION, SOLUTION INTRAMUSCULAR; INTRAVENOUS AS NEEDED
Status: DISCONTINUED | OUTPATIENT
Start: 2022-12-28 | End: 2022-12-28 | Stop reason: SURG

## 2022-12-28 RX ORDER — SODIUM CHLORIDE 9 MG/ML
INJECTION, SOLUTION INTRAVENOUS CONTINUOUS PRN
Status: DISCONTINUED | OUTPATIENT
Start: 2022-12-28 | End: 2022-12-28 | Stop reason: SURG

## 2022-12-28 RX ORDER — INDOCYANINE GREEN AND WATER 25 MG
KIT INJECTION AS NEEDED
Status: DISCONTINUED | OUTPATIENT
Start: 2022-12-28 | End: 2022-12-28 | Stop reason: SURG

## 2022-12-28 RX ORDER — PROCHLORPERAZINE EDISYLATE 5 MG/ML
5 INJECTION INTRAMUSCULAR; INTRAVENOUS EVERY 8 HOURS PRN
Status: DISCONTINUED | OUTPATIENT
Start: 2022-12-28 | End: 2023-01-02

## 2022-12-28 RX ORDER — ONDANSETRON 2 MG/ML
4 INJECTION INTRAMUSCULAR; INTRAVENOUS EVERY 6 HOURS PRN
Status: DISCONTINUED | OUTPATIENT
Start: 2022-12-28 | End: 2022-12-28 | Stop reason: HOSPADM

## 2022-12-28 RX ORDER — SODIUM CHLORIDE, SODIUM LACTATE, POTASSIUM CHLORIDE, CALCIUM CHLORIDE 600; 310; 30; 20 MG/100ML; MG/100ML; MG/100ML; MG/100ML
INJECTION, SOLUTION INTRAVENOUS CONTINUOUS PRN
Status: DISCONTINUED | OUTPATIENT
Start: 2022-12-28 | End: 2022-12-28 | Stop reason: SURG

## 2022-12-28 RX ORDER — LIDOCAINE HYDROCHLORIDE ANHYDROUS AND DEXTROSE MONOHYDRATE .8; 5 G/100ML; G/100ML
INJECTION, SOLUTION INTRAVENOUS CONTINUOUS PRN
Status: DISCONTINUED | OUTPATIENT
Start: 2022-12-28 | End: 2022-12-28 | Stop reason: SURG

## 2022-12-28 RX ORDER — HYDROMORPHONE HYDROCHLORIDE 1 MG/ML
0.8 INJECTION, SOLUTION INTRAMUSCULAR; INTRAVENOUS; SUBCUTANEOUS EVERY 2 HOUR PRN
Status: DISCONTINUED | OUTPATIENT
Start: 2022-12-28 | End: 2023-01-02

## 2022-12-28 RX ORDER — HYDROMORPHONE HYDROCHLORIDE 1 MG/ML
0.4 INJECTION, SOLUTION INTRAMUSCULAR; INTRAVENOUS; SUBCUTANEOUS EVERY 5 MIN PRN
Status: DISCONTINUED | OUTPATIENT
Start: 2022-12-28 | End: 2022-12-28 | Stop reason: HOSPADM

## 2022-12-28 RX ORDER — LIDOCAINE HYDROCHLORIDE 10 MG/ML
INJECTION, SOLUTION INFILTRATION; PERINEURAL AS NEEDED
Status: DISCONTINUED | OUTPATIENT
Start: 2022-12-28 | End: 2022-12-28 | Stop reason: HOSPADM

## 2022-12-28 RX ORDER — DEXAMETHASONE SODIUM PHOSPHATE 4 MG/ML
VIAL (ML) INJECTION AS NEEDED
Status: DISCONTINUED | OUTPATIENT
Start: 2022-12-28 | End: 2022-12-28 | Stop reason: SURG

## 2022-12-28 RX ORDER — HEPARIN SODIUM 5000 [USP'U]/ML
5000 INJECTION, SOLUTION INTRAVENOUS; SUBCUTANEOUS EVERY 8 HOURS SCHEDULED
Status: DISCONTINUED | OUTPATIENT
Start: 2022-12-29 | End: 2022-12-28

## 2022-12-28 RX ORDER — PROCHLORPERAZINE EDISYLATE 5 MG/ML
5 INJECTION INTRAMUSCULAR; INTRAVENOUS EVERY 8 HOURS PRN
Status: DISCONTINUED | OUTPATIENT
Start: 2022-12-28 | End: 2022-12-28 | Stop reason: HOSPADM

## 2022-12-28 RX ORDER — OXYCODONE HYDROCHLORIDE 10 MG/1
10 TABLET ORAL EVERY 4 HOURS PRN
Status: DISCONTINUED | OUTPATIENT
Start: 2022-12-28 | End: 2023-01-02

## 2022-12-28 RX ORDER — ROCURONIUM BROMIDE 10 MG/ML
INJECTION, SOLUTION INTRAVENOUS AS NEEDED
Status: DISCONTINUED | OUTPATIENT
Start: 2022-12-28 | End: 2022-12-28 | Stop reason: SURG

## 2022-12-28 RX ORDER — FAMOTIDINE 20 MG/1
20 TABLET, FILM COATED ORAL 2 TIMES DAILY
Status: DISCONTINUED | OUTPATIENT
Start: 2022-12-28 | End: 2023-01-02

## 2022-12-28 RX ORDER — OXYCODONE HYDROCHLORIDE 5 MG/1
5 TABLET ORAL EVERY 4 HOURS PRN
Status: DISCONTINUED | OUTPATIENT
Start: 2022-12-28 | End: 2023-01-02

## 2022-12-28 RX ORDER — CEFTRIAXONE 1 G/1
INJECTION, POWDER, FOR SOLUTION INTRAMUSCULAR; INTRAVENOUS AS NEEDED
Status: DISCONTINUED | OUTPATIENT
Start: 2022-12-28 | End: 2022-12-28 | Stop reason: SURG

## 2022-12-28 RX ORDER — DIPHENHYDRAMINE HYDROCHLORIDE 50 MG/ML
12.5 INJECTION INTRAMUSCULAR; INTRAVENOUS AS NEEDED
Status: DISCONTINUED | OUTPATIENT
Start: 2022-12-28 | End: 2022-12-28 | Stop reason: HOSPADM

## 2022-12-28 RX ADMIN — INDOCYANINE GREEN AND WATER 7.5 MG: 25 MG KIT INJECTION at 13:29:00

## 2022-12-28 RX ADMIN — ROCURONIUM BROMIDE 20 MG: 10 INJECTION, SOLUTION INTRAVENOUS at 13:11:00

## 2022-12-28 RX ADMIN — NEOSTIGMINE METHYLSULFATE 5 MG: 1 INJECTION, SOLUTION INTRAVENOUS at 14:24:00

## 2022-12-28 RX ADMIN — LIDOCAINE HYDROCHLORIDE ANHYDROUS AND DEXTROSE MONOHYDRATE 2 MG/KG/HR: .8; 5 INJECTION, SOLUTION INTRAVENOUS at 12:18:00

## 2022-12-28 RX ADMIN — METRONIDAZOLE 500 MG: 500 INJECTION, SOLUTION INTRAVENOUS at 12:04:00

## 2022-12-28 RX ADMIN — INDOCYANINE GREEN AND WATER 7.5 MG: 25 MG KIT INJECTION at 12:42:00

## 2022-12-28 RX ADMIN — ROCURONIUM BROMIDE 50 MG: 10 INJECTION, SOLUTION INTRAVENOUS at 11:48:00

## 2022-12-28 RX ADMIN — DEXAMETHASONE SODIUM PHOSPHATE 8 MG: 4 MG/ML VIAL (ML) INJECTION at 14:20:00

## 2022-12-28 RX ADMIN — CEFTRIAXONE 1 G: 1 INJECTION, POWDER, FOR SOLUTION INTRAMUSCULAR; INTRAVENOUS at 11:54:00

## 2022-12-28 RX ADMIN — ONDANSETRON 4 MG: 2 INJECTION INTRAMUSCULAR; INTRAVENOUS at 14:20:00

## 2022-12-28 RX ADMIN — SODIUM CHLORIDE: 9 INJECTION, SOLUTION INTRAVENOUS at 11:40:00

## 2022-12-28 RX ADMIN — SODIUM CHLORIDE, SODIUM LACTATE, POTASSIUM CHLORIDE, CALCIUM CHLORIDE: 600; 310; 30; 20 INJECTION, SOLUTION INTRAVENOUS at 14:49:00

## 2022-12-28 RX ADMIN — GLYCOPYRROLATE 0.8 MG: 0.2 INJECTION, SOLUTION INTRAMUSCULAR; INTRAVENOUS at 14:24:00

## 2022-12-28 RX ADMIN — DEXTROSE MONOHYDRATE: 50 INJECTION, SOLUTION INTRAVENOUS at 11:53:00

## 2022-12-28 RX ADMIN — LIDOCAINE HYDROCHLORIDE 100 MG: 10 INJECTION, SOLUTION EPIDURAL; INFILTRATION; INTRACAUDAL; PERINEURAL at 11:48:00

## 2022-12-28 RX ADMIN — DEXTROSE MONOHYDRATE: 50 INJECTION, SOLUTION INTRAVENOUS at 11:55:00

## 2022-12-28 RX ADMIN — EPHEDRINE SULFATE 5 MG: 50 INJECTION INTRAVENOUS at 13:06:00

## 2022-12-28 RX ADMIN — EPHEDRINE SULFATE 5 MG: 50 INJECTION INTRAVENOUS at 12:45:00

## 2022-12-28 RX ADMIN — ROCURONIUM BROMIDE 10 MG: 10 INJECTION, SOLUTION INTRAVENOUS at 14:07:00

## 2022-12-28 RX ADMIN — SODIUM CHLORIDE, SODIUM LACTATE, POTASSIUM CHLORIDE, CALCIUM CHLORIDE: 600; 310; 30; 20 INJECTION, SOLUTION INTRAVENOUS at 13:48:00

## 2022-12-28 NOTE — PROGRESS NOTES
1025: Patient transported to surgery in stable condition. Consent signed, pre-op checklist completed, pre-op antibiotics sent down on chart. 1500: Called MELECIO Nguyen to inform her of 's signed and held post op orders saying to discontinue Heparin drip and start subcu Heparin. Wendy clarified with  and they want no Heparin given and will re-evaluate patient tomorrow before deciding when to start Heparin drip again. 1555: Patient returned to the floor via bed from PACU in stable condition. Family present at bedside.

## 2022-12-28 NOTE — PROGRESS NOTES
Patient has IV fluids infusing, on room air, on telemetry running NSR, strong in place with clear yellow urine, drowsy post op, incisions are C/D/I, on a clear liquid ERAS diet. Patient and family updated on plan of care. 1703: Patient complaining of some shortness of breath and difficulty taking a deep breath. Vitals WNL, placed on 2L oxygen for comfort. Patient resting comfortably.

## 2022-12-28 NOTE — PLAN OF CARE
Upon assessment, patient is resting in bed with family at bedside. A&O x4, VSS, tolerating RA. Patient denies chest pain, n/v, or generalized pain. Patient states she gets mildly dyspneic with exertion. Heparin drip infusing per MAR. Tolerating bowel prep for procedure tomorrow. Bowel movements are mostly clear with small amount of maroon colored stool. Voiding in toilet. Patient is menstruating. Tolerating clears, NPO at midnight for procedure. Up ad toma. Patient updated on poc & instructed to use call light if in need; verbalized understanding. Call light within reach. All questions answered. 2100: Ensure pre-surgery drink completed. 2230: CHG bath completed. 0130: Patient has completed a majority of bowel prep and bowel movements are maroon-tinged but clear. 0600: Second CHG bath completed and second Ensure pre-surgery drink completed. 0604: Patient is refusing to have CBC drawn this morning prior to hemicolectomy scheduled with Dr. Justin Kraus. Phlebotomist was able to draw other morning labs but unable to obtain CBC. Patient is a hard stick and refusing to \"be poked again\" as it is too painful. Dr. Martinez Palomino notified and states that CBC does not have to be drawn at this time and will be drawn later. No new orders.      Problem: Patient/Family Goals  Goal: Patient/Family Long Term Goal  Description: Patient's Long Term Goal: Discharge    Interventions:  - Surgery and GI consults  - Heparin drip   - Pain management  - See additional Care Plan goals for specific interventions  Outcome: Progressing  Goal: Patient/Family Short Term Goal  Description: Patient's Short Term Goal: Comfort care    Interventions:   - PRN pain and nausea medication  - See additional Care Plan goals for specific interventions  Outcome: Progressing     Problem: RESPIRATORY - ADULT  Goal: Achieves optimal ventilation and oxygenation  Description: INTERVENTIONS:  - Assess for changes in respiratory status  - Assess for changes in mentation and behavior  - Position to facilitate oxygenation and minimize respiratory effort  - Oxygen supplementation based on oxygen saturation or ABGs  - Provide Smoking Cessation handout, if applicable  - Encourage broncho-pulmonary hygiene including cough, deep breathe, Incentive Spirometry  - Assess the need for suctioning and perform as needed  - Assess and instruct to report SOB or any respiratory difficulty  - Respiratory Therapy support as indicated  - Manage/alleviate anxiety  - Monitor for signs/symptoms of CO2 retention  Outcome: Progressing     Problem: SAFETY ADULT - FALL  Goal: Free from fall injury  Description: INTERVENTIONS:  - Assess pt frequently for physical needs  - Identify cognitive and physical deficits and behaviors that affect risk of falls.   - [place] fall precautions as indicated by assessment.  - Educate pt/family on patient safety including physical limitations  - Instruct pt to call for assistance with activity based on assessment  - Modify environment to reduce risk of injury  - Provide assistive devices as appropriate  - Consider OT/PT consult to assist with strengthening/mobility  - Encourage toileting schedule  Outcome: Progressing     Problem: HEMATOLOGIC - ADULT  Goal: Free from bleeding injury  Description: (Example usage: patient with low platelets)  INTERVENTIONS:  - Avoid intramuscular injections, enemas and rectal medication administration  - Ensure safe mobilization of patient  - Hold pressure on venipuncture sites to achieve adequate hemostasis  - Assess for signs and symptoms of internal bleeding  - Monitor lab trends  - Patient is to report abnormal signs of bleeding to staff  - Avoid use of toothpicks and dental floss  - Use electric shaver for shaving  - Use soft bristle tooth brush  - Limit straining and forceful nose blowing  Outcome: Progressing

## 2022-12-28 NOTE — ANESTHESIA PROCEDURE NOTES
Airway  Date/Time: 12/28/2022 11:51 AM  Urgency: elective      General Information and Staff    Patient location during procedure: OR  Anesthesiologist: Bryson Nielsen MD  Performed: anesthesiologist     Indications and Patient Condition  Indications for airway management: anesthesia  Sedation level: deep  Preoxygenated: yes  Patient position: sniffing  Mask difficulty assessment: 1 - vent by mask    Final Airway Details  Final airway type: endotracheal airway      Successful airway: ETT  Cuffed: yes   Successful intubation technique: direct laryngoscopy  Facilitating devices/methods: intubating stylet  Endotracheal tube insertion site: oral  Blade: Jenifer  Blade size: #3  ETT size (mm): 7.5    Cormack-Lehane Classification: grade I - full view of glottis  Placement verified by: chest auscultation and capnometry   Cuff volume (mL): 7  Measured from: lips  ETT to lips (cm): 21  Number of attempts at approach: 1  Ventilation between attempts: none  Number of other approaches attempted: 0

## 2022-12-28 NOTE — PLAN OF CARE
Problem: Patient/Family Goals  Goal: Patient/Family Long Term Goal  Description: Patient's Long Term Goal: Discharge home    Interventions:  - Pain tolerable  - Tolerating diet  - Return to previous ADL's  - See additional Care Plan goals for specific interventions  Outcome: Progressing  Goal: Patient/Family Short Term Goal  Description: Patient's Short Term Goal: Prepare for discharge home    Interventions:   - Advance diet as tolerated  - Transition IV to oral medications  - Encourage ambulation  - See additional Care Plan goals for specific interventions  Outcome: Progressing     Problem: RESPIRATORY - ADULT  Goal: Achieves optimal ventilation and oxygenation  Description: INTERVENTIONS:  - Assess for changes in respiratory status  - Assess for changes in mentation and behavior  - Position to facilitate oxygenation and minimize respiratory effort  - Oxygen supplementation based on oxygen saturation or ABGs  - Provide Smoking Cessation handout, if applicable  - Encourage broncho-pulmonary hygiene including cough, deep breathe, Incentive Spirometry  - Assess the need for suctioning and perform as needed  - Assess and instruct to report SOB or any respiratory difficulty  - Respiratory Therapy support as indicated  - Manage/alleviate anxiety  - Monitor for signs/symptoms of CO2 retention  Outcome: Progressing     Problem: SAFETY ADULT - FALL  Goal: Free from fall injury  Description: INTERVENTIONS:  - Assess pt frequently for physical needs  - Identify cognitive and physical deficits and behaviors that affect risk of falls.   - [place] fall precautions as indicated by assessment.  - Educate pt/family on patient safety including physical limitations  - Instruct pt to call for assistance with activity based on assessment  - Modify environment to reduce risk of injury  - Provide assistive devices as appropriate  - Consider OT/PT consult to assist with strengthening/mobility  - Encourage toileting schedule  Outcome: Progressing     Problem: HEMATOLOGIC - ADULT  Goal: Free from bleeding injury  Description: (Example usage: patient with low platelets)  INTERVENTIONS:  - Avoid intramuscular injections, enemas and rectal medication administration  - Ensure safe mobilization of patient  - Hold pressure on venipuncture sites to achieve adequate hemostasis  - Assess for signs and symptoms of internal bleeding  - Monitor lab trends  - Patient is to report abnormal signs of bleeding to staff  - Avoid use of toothpicks and dental floss  - Use electric shaver for shaving  - Use soft bristle tooth brush  - Limit straining and forceful nose blowing  Outcome: Progressing

## 2022-12-28 NOTE — OPERATIVE REPORT
[address]                                                         Operative Note    Mo Araujo Location: [facility]   CSN [id number] MRN [id number]   Admission Date 12/23/2022 Procedure Date 12/28/2022   Attending Physician Michael Jaeger DO Procedure Physician Florentino Gustafson MD     Pre-Operative Diagnosis: COLON CANCER     Post-Operative Diagnosis: COLON CANCER    Procedure Performed: ROBOTIC ASSISTED TRANSVERSE COLECTOMY    Surgeon: Florentino Gustafson MD     Assistant(s):  Surgical Assistant.: Nilam Rose, ISABEL     The surgical Assistant was necessary for this procedure. The assistant was involved in patient positioning, instrument exchange, improving exposure optimizing visualization of patient's safety, and closure. The duties of the assistant allowed for reduced risk of the performance of this procedure and care of this patient         Anesthesia: General       Complications: none       Estimated Blood Loss: No data recorded            Operative Summary: Patient was taken to the operating room placed in a supine position. She was prepped and draped in usual fashion be placed under general anesthesia. Veress needle was inserted in the abdomen and the abdomen is insufflated 15 mm of carbon dioxide. 8 mm trocar was inserted and as well as the camera. Evaluation of the abdomen revealed the tattoo inking to be present more so in the mid transverse colon as opposed to the proximal transverse colon. I elected at this point to proceed with a transverse colectomy. 2 additional 8 mm trochars and a 12 mm trocar were placed in the lower abdomen and a straight line across. A 5 mm air seal port was placed and the entire operation was performed at low pressure. We ran the pressures between 6 and 8 mm during the case. X. 3104 adsquare robot was brought into position and docked. Tattooing was present in the mid transverse colon. Colon was reflected upward exposing the mesentery.   This was scored along a course of prepared resection. This was followed by dissecting out the middle colic bundle and ligated at its base. Mesenteric vasculature supplying this portion of transverse colon was ligated up to the level of the colon. This was followed by entering the lesser sac dissecting the stomach away from the transverse colon entering the window created from the previous dissection. Once this mobilization was completed firefly was utilized to appreciate the demarcation zone. I traveled at least 10 cm laterally in both directions of the tattooing and divided the colon in these locations. Specimen was set aside. The hepatic flexure and splenic flexure were mobilized to allow for a tension-free anastomosis. The colon was brought into position and approximated together with two 3-0 silk sutures. The back wall was then approximated with a running 3 OV lock suture bringing the staple lines together. 2 colotomies were made and a handsewn anastomosis was performed. This was done in 2 layers by first bringing together the first layer with a running 3 OV lock followed by second layer with interrupted 3 0 silks. I did place a piece of Gelfoam in the lumen to help with orientation. At this time ICG was given again confirming that there was good coloration of green and viability to this anastomotic line. Once this was accomplished the specimen was retrieved by enlarging the wound at the 12 mm trocar site. The specimen was palpated and the tumor was present within the central portion of this length of bile. Prior to closing the abdomen was irrigated. The wound that the specimen was retrieved was closed with 2 layers of absorbable suture. Remainder the wounds were closed with Monocryl.   The patient was awoken and taken to the recovery room in satisfactory condition        Hany Evans MD  12/28/2022  2:19 PM

## 2022-12-29 LAB
ANION GAP SERPL CALC-SCNC: 6 MMOL/L (ref 0–18)
APTT PPP: 129.1 SECONDS (ref 23.3–35.6)
APTT PPP: 27.1 SECONDS (ref 23.3–35.6)
BASOPHILS # BLD AUTO: 0.01 X10(3) UL (ref 0–0.2)
BASOPHILS NFR BLD AUTO: 0.2 %
BUN BLD-MCNC: 5 MG/DL (ref 7–18)
CALCIUM BLD-MCNC: 8.6 MG/DL (ref 8.5–10.1)
CHLORIDE SERPL-SCNC: 107 MMOL/L (ref 98–112)
CO2 SERPL-SCNC: 28 MMOL/L (ref 21–32)
CREAT BLD-MCNC: 0.74 MG/DL
EOSINOPHIL # BLD AUTO: 0 X10(3) UL (ref 0–0.7)
EOSINOPHIL NFR BLD AUTO: 0 %
ERYTHROCYTE [DISTWIDTH] IN BLOOD BY AUTOMATED COUNT: 12.3 %
GFR SERPLBLD BASED ON 1.73 SQ M-ARVRAT: 96 ML/MIN/1.73M2 (ref 60–?)
GLUCOSE BLD-MCNC: 129 MG/DL (ref 70–99)
HCT VFR BLD AUTO: 27.3 %
HGB BLD-MCNC: 8.9 G/DL
IMM GRANULOCYTES # BLD AUTO: 0.02 X10(3) UL (ref 0–1)
IMM GRANULOCYTES NFR BLD: 0.3 %
LYMPHOCYTES # BLD AUTO: 0.53 X10(3) UL (ref 1–4)
LYMPHOCYTES NFR BLD AUTO: 8.4 %
MAGNESIUM SERPL-MCNC: 2 MG/DL (ref 1.6–2.6)
MCH RBC QN AUTO: 28.9 PG (ref 26–34)
MCHC RBC AUTO-ENTMCNC: 32.6 G/DL (ref 31–37)
MCV RBC AUTO: 88.6 FL
MONOCYTES # BLD AUTO: 0.5 X10(3) UL (ref 0.1–1)
MONOCYTES NFR BLD AUTO: 7.9 %
NEUTROPHILS # BLD AUTO: 5.25 X10 (3) UL (ref 1.5–7.7)
NEUTROPHILS # BLD AUTO: 5.25 X10(3) UL (ref 1.5–7.7)
NEUTROPHILS NFR BLD AUTO: 83.2 %
OSMOLALITY SERPL CALC.SUM OF ELEC: 291 MOSM/KG (ref 275–295)
PHOSPHATE SERPL-MCNC: 2.5 MG/DL (ref 2.5–4.9)
PLATELET # BLD AUTO: 145 10(3)UL (ref 150–450)
POTASSIUM SERPL-SCNC: 4.2 MMOL/L (ref 3.5–5.1)
RBC # BLD AUTO: 3.08 X10(6)UL
SODIUM SERPL-SCNC: 141 MMOL/L (ref 136–145)
WBC # BLD AUTO: 6.3 X10(3) UL (ref 4–11)

## 2022-12-29 RX ORDER — HEPARIN SODIUM AND DEXTROSE 10000; 5 [USP'U]/100ML; G/100ML
INJECTION INTRAVENOUS CONTINUOUS
Status: DISCONTINUED | OUTPATIENT
Start: 2022-12-29 | End: 2023-01-01

## 2022-12-29 RX ORDER — HEPARIN SODIUM 1000 [USP'U]/ML
80 INJECTION, SOLUTION INTRAVENOUS; SUBCUTANEOUS ONCE
Status: COMPLETED | OUTPATIENT
Start: 2022-12-29 | End: 2022-12-29

## 2022-12-29 RX ORDER — HEPARIN SODIUM AND DEXTROSE 10000; 5 [USP'U]/100ML; G/100ML
18 INJECTION INTRAVENOUS ONCE
Status: COMPLETED | OUTPATIENT
Start: 2022-12-29 | End: 2022-12-29

## 2022-12-29 NOTE — PLAN OF CARE
Assumed care for patient at [phone]. AxOx4, VSS, O2=2L/min via NC,  and Tele, strong in place draining clear yellow, X3 lap sites identified clean and dry, Aquacel to midline abdomen in place, clean and dry. Unable to identify x2 additional lap sites to abdomen, presumed under the dressing. Rt groin dressing clean, dry, and intact. ERAS protocol in place. Clears tolerated and advanced to soft. No reports of N/V. Patient stated she will not allow staff to draw labs or attempt another IV placement due to multi failed attempts prior and with right arm precautions. Hospitalist Dr. Geoff Covarrubias contacted/ paged, order for midline insertion by vascular team initiated with request approval from patient. Dr. Pam Serrato ordered to have PTT redrawn this AM once access is established and to follow up to resume heparin drip orders.

## 2022-12-29 NOTE — PLAN OF CARE
Assumed care for this pt at [address] [place] [address]. Pt alert oriented pt  at bedside. Vss,  Shetty draining clear yellow urine. . dressing clean, dry intact. 2l nasal canula. Eras protocol, clear liquid. Pain managed per prn medication. Will continue to monitor.

## 2022-12-29 NOTE — PLAN OF CARE
Patient is alert and oriented. On room air. Abdomen is soft/ tender. Patient denies nausea. Patient ordered low-fiber diet for breakfast. Shetty removed. Patient is due to void this morning. Patient ambulated the halls. Patient now has midline placed with blood return noted. Waiting for PTT level to result before starting heparin gtt. POC updated with patient. Patient verbalized understanding. Problem: Patient/Family Goals  Goal: Patient/Family Long Term Goal  Description: Patient's Long Term Goal: Discharge home    Interventions:  - Pain tolerable  - Tolerating diet  - Return to previous ADL's  - See additional Care Plan goals for specific interventions  Outcome: Progressing  Goal: Patient/Family Short Term Goal  Description: Patient's Short Term Goal: Prepare for discharge home    Interventions:   - Advance diet as tolerated  - Transition IV to oral medications  - Encourage ambulation  - See additional Care Plan goals for specific interventions  Outcome: Progressing     Problem: SAFETY ADULT - FALL  Goal: Free from fall injury  Description: INTERVENTIONS:  - Assess pt frequently for physical needs  - Identify cognitive and physical deficits and behaviors that affect risk of falls.   - [place] fall precautions as indicated by assessment.  - Educate pt/family on patient safety including physical limitations  - Instruct pt to call for assistance with activity based on assessment  - Modify environment to reduce risk of injury  - Provide assistive devices as appropriate  - Consider OT/PT consult to assist with strengthening/mobility  - Encourage toileting schedule  Outcome: Progressing     Problem: RESPIRATORY - ADULT  Goal: Achieves optimal ventilation and oxygenation  Description: INTERVENTIONS:  - Assess for changes in respiratory status  - Assess for changes in mentation and behavior  - Position to facilitate oxygenation and minimize respiratory effort  - Oxygen supplementation based on oxygen saturation or ABGs  - Provide Smoking Cessation handout, if applicable  - Encourage broncho-pulmonary hygiene including cough, deep breathe, Incentive Spirometry  - Assess the need for suctioning and perform as needed  - Assess and instruct to report SOB or any respiratory difficulty  - Respiratory Therapy support as indicated  - Manage/alleviate anxiety  - Monitor for signs/symptoms of CO2 retention  Outcome: Progressing     Problem: HEMATOLOGIC - ADULT  Goal: Free from bleeding injury  Description: (Example usage: patient with low platelets)  INTERVENTIONS:  - Avoid intramuscular injections, enemas and rectal medication administration  - Ensure safe mobilization of patient  - Hold pressure on venipuncture sites to achieve adequate hemostasis  - Assess for signs and symptoms of internal bleeding  - Monitor lab trends  - Patient is to report abnormal signs of bleeding to staff  - Avoid use of toothpicks and dental floss  - Use electric shaver for shaving  - Use soft bristle tooth brush  - Limit straining and forceful nose blowing  Outcome: Progressing

## 2022-12-30 LAB
ANION GAP SERPL CALC-SCNC: 1 MMOL/L (ref 0–18)
APTT PPP: 108.4 SECONDS (ref 23.3–35.6)
APTT PPP: 62.2 SECONDS (ref 23.3–35.6)
APTT PPP: 81 SECONDS (ref 23.3–35.6)
BUN BLD-MCNC: 12 MG/DL (ref 7–18)
CALCIUM BLD-MCNC: 8.3 MG/DL (ref 8.5–10.1)
CHLORIDE SERPL-SCNC: 107 MMOL/L (ref 98–112)
CO2 SERPL-SCNC: 30 MMOL/L (ref 21–32)
CREAT BLD-MCNC: 0.76 MG/DL
ERYTHROCYTE [DISTWIDTH] IN BLOOD BY AUTOMATED COUNT: 12.5 %
GFR SERPLBLD BASED ON 1.73 SQ M-ARVRAT: 93 ML/MIN/1.73M2 (ref 60–?)
GLUCOSE BLD-MCNC: 118 MG/DL (ref 70–99)
HCT VFR BLD AUTO: 25.9 %
HGB BLD-MCNC: 8.2 G/DL
HGB BLD-MCNC: 9.2 G/DL
MAGNESIUM SERPL-MCNC: 2 MG/DL (ref 1.6–2.6)
MCH RBC QN AUTO: 28.2 PG (ref 26–34)
MCHC RBC AUTO-ENTMCNC: 31.7 G/DL (ref 31–37)
MCV RBC AUTO: 89 FL
OSMOLALITY SERPL CALC.SUM OF ELEC: 287 MOSM/KG (ref 275–295)
PHOSPHATE SERPL-MCNC: 2.7 MG/DL (ref 2.5–4.9)
PLATELET # BLD AUTO: 150 10(3)UL (ref 150–450)
POTASSIUM SERPL-SCNC: 3.7 MMOL/L (ref 3.5–5.1)
RBC # BLD AUTO: 2.91 X10(6)UL
SODIUM SERPL-SCNC: 138 MMOL/L (ref 136–145)
WBC # BLD AUTO: 5.8 X10(3) UL (ref 4–11)

## 2022-12-30 RX ORDER — POTASSIUM CHLORIDE 20 MEQ/1
40 TABLET, EXTENDED RELEASE ORAL ONCE
Status: COMPLETED | OUTPATIENT
Start: 2022-12-30 | End: 2022-12-30

## 2022-12-30 RX ORDER — METOCLOPRAMIDE HYDROCHLORIDE 5 MG/ML
10 INJECTION INTRAMUSCULAR; INTRAVENOUS EVERY 6 HOURS
Status: DISCONTINUED | OUTPATIENT
Start: 2022-12-30 | End: 2023-01-02

## 2022-12-30 RX ORDER — BISACODYL 10 MG
10 SUPPOSITORY, RECTAL RECTAL
Status: DISCONTINUED | OUTPATIENT
Start: 2022-12-30 | End: 2023-01-02

## 2022-12-30 NOTE — PLAN OF CARE
Pt vitals stable, A&O x4. Telemetry in place. Pt denied chest pain. Incision to right groin, laps x2, and transverse noted. Tolerating diet. Pt states feeling bloated. Denies passing gas or having BM. Bed locked in low position, call light in reach, rounding provided. Problem: Patient/Family Goals  Goal: Patient/Family Long Term Goal  Description: Patient's Long Term Goal: Discharge home    Interventions:  - Pain tolerable  - Tolerating diet  - Return to previous ADL's  - See additional Care Plan goals for specific interventions  Outcome: Progressing  Goal: Patient/Family Short Term Goal  Description: Patient's Short Term Goal: Prepare for discharge home    Interventions:   - Advance diet as tolerated  - Transition IV to oral medications  - Encourage ambulation  - See additional Care Plan goals for specific interventions  Outcome: Progressing     Problem: RESPIRATORY - ADULT  Goal: Achieves optimal ventilation and oxygenation  Description: INTERVENTIONS:  - Assess for changes in respiratory status  - Assess for changes in mentation and behavior  - Position to facilitate oxygenation and minimize respiratory effort  - Oxygen supplementation based on oxygen saturation or ABGs  - Provide Smoking Cessation handout, if applicable  - Encourage broncho-pulmonary hygiene including cough, deep breathe, Incentive Spirometry  - Assess the need for suctioning and perform as needed  - Assess and instruct to report SOB or any respiratory difficulty  - Respiratory Therapy support as indicated  - Manage/alleviate anxiety  - Monitor for signs/symptoms of CO2 retention  Outcome: Progressing     Problem: SAFETY ADULT - FALL  Goal: Free from fall injury  Description: INTERVENTIONS:  - Assess pt frequently for physical needs  - Identify cognitive and physical deficits and behaviors that affect risk of falls.   - [place] fall precautions as indicated by assessment.  - Educate pt/family on patient safety including physical limitations  - Instruct pt to call for assistance with activity based on assessment  - Modify environment to reduce risk of injury  - Provide assistive devices as appropriate  - Consider OT/PT consult to assist with strengthening/mobility  - Encourage toileting schedule  Outcome: Progressing     Problem: HEMATOLOGIC - ADULT  Goal: Free from bleeding injury  Description: (Example usage: patient with low platelets)  INTERVENTIONS:  - Avoid intramuscular injections, enemas and rectal medication administration  - Ensure safe mobilization of patient  - Hold pressure on venipuncture sites to achieve adequate hemostasis  - Assess for signs and symptoms of internal bleeding  - Monitor lab trends  - Patient is to report abnormal signs of bleeding to staff  - Avoid use of toothpicks and dental floss  - Use electric shaver for shaving  - Use soft bristle tooth brush  - Limit straining and forceful nose blowing  Outcome: Progressing

## 2022-12-30 NOTE — PLAN OF CARE
Patient is alert and oriented. ON room air. Patient is receiving heparin gtt. PTT within therapeutic range. Next PTT level draw tomorrow. Abdomen is soft/ non-distended. Patient had a very small BM this morning. Patient states barely passing gas. Patient denies nausea. Lap sites x5 with skin glue. On low-fiber diet. POC updated with patient. Patient verbalized understanding. Problem: Patient/Family Goals  Goal: Patient/Family Long Term Goal  Description: Patient's Long Term Goal: Discharge home    Interventions:  - Pain tolerable  - Tolerating diet  - Return to previous ADL's  - See additional Care Plan goals for specific interventions  Outcome: Progressing  Goal: Patient/Family Short Term Goal  Description: Patient's Short Term Goal: Prepare for discharge home    Interventions:   - Advance diet as tolerated  - Transition IV to oral medications  - Encourage ambulation  - See additional Care Plan goals for specific interventions  Outcome: Progressing     Problem: SAFETY ADULT - FALL  Goal: Free from fall injury  Description: INTERVENTIONS:  - Assess pt frequently for physical needs  - Identify cognitive and physical deficits and behaviors that affect risk of falls.   - [place] fall precautions as indicated by assessment.  - Educate pt/family on patient safety including physical limitations  - Instruct pt to call for assistance with activity based on assessment  - Modify environment to reduce risk of injury  - Provide assistive devices as appropriate  - Consider OT/PT consult to assist with strengthening/mobility  - Encourage toileting schedule  Outcome: Progressing     Problem: RESPIRATORY - ADULT  Goal: Achieves optimal ventilation and oxygenation  Description: INTERVENTIONS:  - Assess for changes in respiratory status  - Assess for changes in mentation and behavior  - Position to facilitate oxygenation and minimize respiratory effort  - Oxygen supplementation based on oxygen saturation or ABGs  - Provide Smoking Cessation handout, if applicable  - Encourage broncho-pulmonary hygiene including cough, deep breathe, Incentive Spirometry  - Assess the need for suctioning and perform as needed  - Assess and instruct to report SOB or any respiratory difficulty  - Respiratory Therapy support as indicated  - Manage/alleviate anxiety  - Monitor for signs/symptoms of CO2 retention  Outcome: Progressing     Problem: HEMATOLOGIC - ADULT  Goal: Free from bleeding injury  Description: (Example usage: patient with low platelets)  INTERVENTIONS:  - Avoid intramuscular injections, enemas and rectal medication administration  - Ensure safe mobilization of patient  - Hold pressure on venipuncture sites to achieve adequate hemostasis  - Assess for signs and symptoms of internal bleeding  - Monitor lab trends  - Patient is to report abnormal signs of bleeding to staff  - Avoid use of toothpicks and dental floss  - Use electric shaver for shaving  - Use soft bristle tooth brush  - Limit straining and forceful nose blowing  Outcome: Progressing

## 2022-12-31 LAB
ANION GAP SERPL CALC-SCNC: 4 MMOL/L (ref 0–18)
APTT PPP: 76 SECONDS (ref 23.3–35.6)
BUN BLD-MCNC: 12 MG/DL (ref 7–18)
CALCIUM BLD-MCNC: 8 MG/DL (ref 8.5–10.1)
CHLORIDE SERPL-SCNC: 107 MMOL/L (ref 98–112)
CO2 SERPL-SCNC: 29 MMOL/L (ref 21–32)
CREAT BLD-MCNC: 0.59 MG/DL
ERYTHROCYTE [DISTWIDTH] IN BLOOD BY AUTOMATED COUNT: 12.6 %
GFR SERPLBLD BASED ON 1.73 SQ M-ARVRAT: 107 ML/MIN/1.73M2 (ref 60–?)
GLUCOSE BLD-MCNC: 132 MG/DL (ref 70–99)
HCT VFR BLD AUTO: 23.3 %
HCT VFR BLD AUTO: 26.5 %
HGB BLD-MCNC: 7.5 G/DL
HGB BLD-MCNC: 8.6 G/DL
MAGNESIUM SERPL-MCNC: 1.9 MG/DL (ref 1.6–2.6)
MCH RBC QN AUTO: 28.3 PG (ref 26–34)
MCHC RBC AUTO-ENTMCNC: 32.2 G/DL (ref 31–37)
MCV RBC AUTO: 87.9 FL
OSMOLALITY SERPL CALC.SUM OF ELEC: 292 MOSM/KG (ref 275–295)
PLATELET # BLD AUTO: 131 10(3)UL (ref 150–450)
POTASSIUM SERPL-SCNC: 4.1 MMOL/L (ref 3.5–5.1)
POTASSIUM SERPL-SCNC: 4.1 MMOL/L (ref 3.5–5.1)
RBC # BLD AUTO: 2.65 X10(6)UL
SODIUM SERPL-SCNC: 140 MMOL/L (ref 136–145)
WBC # BLD AUTO: 5.1 X10(3) UL (ref 4–11)

## 2022-12-31 NOTE — PROGRESS NOTES
Alert & oriented x4. With tolerable pain. passing flatus. tolerated diet. UP in room and hallway. Use of I. S.encouraged. .NO bleeding noted. Plan of care discussed with patient. Will monitor.

## 2022-12-31 NOTE — PROGRESS NOTES
Doing well    S/p colon resection with primary anastomosis    abd soft  Wounds fine    Tolerating diet    Recheck hb later today

## 2022-12-31 NOTE — PLAN OF CARE
A&Ox4. VSS. RA. . Denies chest pain and SOB. Telemetry: NSR. GI: Abdomen soft, nondistended. Passing gas- awaiting BM. Denies nausea. : Voids. Pain controlled with PRN pain medications. Up with standby assist.  Incisions: X5 lap sites to abdomen, CHAYA clean, dry and intact. Diet: Low fiber/ Soft- tolerating well   IVF and Heparin drip running per order. All appropriate safety measures in place. All questions and concerns addressed.      Problem: Patient/Family Goals  Goal: Patient/Family Long Term Goal  Description: Patient's Long Term Goal: Discharge home    Interventions:  - Pain tolerable  - Tolerating diet  - Return to previous ADL's  - See additional Care Plan goals for specific interventions  Outcome: Progressing  Goal: Patient/Family Short Term Goal  Description: Patient's Short Term Goal: Prepare for discharge home    Interventions:   - Advance diet as tolerated  - Transition IV to oral medications  - Encourage ambulation  - See additional Care Plan goals for specific interventions  Outcome: Progressing     Problem: RESPIRATORY - ADULT  Goal: Achieves optimal ventilation and oxygenation  Description: INTERVENTIONS:  - Assess for changes in respiratory status  - Assess for changes in mentation and behavior  - Position to facilitate oxygenation and minimize respiratory effort  - Oxygen supplementation based on oxygen saturation or ABGs  - Provide Smoking Cessation handout, if applicable  - Encourage broncho-pulmonary hygiene including cough, deep breathe, Incentive Spirometry  - Assess the need for suctioning and perform as needed  - Assess and instruct to report SOB or any respiratory difficulty  - Respiratory Therapy support as indicated  - Manage/alleviate anxiety  - Monitor for signs/symptoms of CO2 retention  Outcome: Progressing     Problem: SAFETY ADULT - FALL  Goal: Free from fall injury  Description: INTERVENTIONS:  - Assess pt frequently for physical needs  - Identify cognitive and physical deficits and behaviors that affect risk of falls.   - [place] fall precautions as indicated by assessment.  - Educate pt/family on patient safety including physical limitations  - Instruct pt to call for assistance with activity based on assessment  - Modify environment to reduce risk of injury  - Provide assistive devices as appropriate  - Consider OT/PT consult to assist with strengthening/mobility  - Encourage toileting schedule  Outcome: Progressing     Problem: HEMATOLOGIC - ADULT  Goal: Free from bleeding injury  Description: (Example usage: patient with low platelets)  INTERVENTIONS:  - Avoid intramuscular injections, enemas and rectal medication administration  - Ensure safe mobilization of patient  - Hold pressure on venipuncture sites to achieve adequate hemostasis  - Assess for signs and symptoms of internal bleeding  - Monitor lab trends  - Patient is to report abnormal signs of bleeding to staff  - Avoid use of toothpicks and dental floss  - Use electric shaver for shaving  - Use soft bristle tooth brush  - Limit straining and forceful nose blowing  Outcome: Progressing

## 2022-12-31 NOTE — PLAN OF CARE
Problem: Patient/Family Goals  Goal: Patient/Family Short Term Goal  Description: Patient's Short Term Goal: Prepare for discharge home    Interventions:   - Advance diet as tolerated  - Transition IV to oral medications  - Encourage ambulation  - See additional Care Plan goals for specific interventions  Outcome: Progressing     Problem: RESPIRATORY - ADULT  Goal: Achieves optimal ventilation and oxygenation  Description: INTERVENTIONS:  - Assess for changes in respiratory status  - Assess for changes in mentation and behavior  - Position to facilitate oxygenation and minimize respiratory effort  - Oxygen supplementation based on oxygen saturation or ABGs  - Provide Smoking Cessation handout, if applicable  - Encourage broncho-pulmonary hygiene including cough, deep breathe, Incentive Spirometry  - Assess the need for suctioning and perform as needed  - Assess and instruct to report SOB or any respiratory difficulty  - Respiratory Therapy support as indicated  - Manage/alleviate anxiety  - Monitor for signs/symptoms of CO2 retention  Outcome: Progressing     Problem: SAFETY ADULT - FALL  Goal: Free from fall injury  Description: INTERVENTIONS:  - Assess pt frequently for physical needs  - Identify cognitive and physical deficits and behaviors that affect risk of falls.   - [place] fall precautions as indicated by assessment.  - Educate pt/family on patient safety including physical limitations  - Instruct pt to call for assistance with activity based on assessment  - Modify environment to reduce risk of injury  - Provide assistive devices as appropriate  - Consider OT/PT consult to assist with strengthening/mobility  - Encourage toileting schedule  Outcome: Progressing     Problem: HEMATOLOGIC - ADULT  Goal: Free from bleeding injury  Description: (Example usage: patient with low platelets)  INTERVENTIONS:  - Avoid intramuscular injections, enemas and rectal medication administration  - Ensure safe mobilization of patient  - Hold pressure on venipuncture sites to achieve adequate hemostasis  - Assess for signs and symptoms of internal bleeding  - Monitor lab trends  - Patient is to report abnormal signs of bleeding to staff  - Avoid use of toothpicks and dental floss  - Use electric shaver for shaving  - Use soft bristle tooth brush  - Limit straining and forceful nose blowing  Outcome: Progressing     Problem: Patient/Family Goals  Goal: Patient/Family Long Term Goal  Description: Patient's Long Term Goal: Discharge home    Interventions:  - Pain tolerable  - Tolerating diet  - Return to previous ADL's  - See additional Care Plan goals for specific interventions  Outcome: Not Progressing

## 2023-01-01 LAB
ANION GAP SERPL CALC-SCNC: 0 MMOL/L (ref 0–18)
APTT PPP: 73.5 SECONDS (ref 23.3–35.6)
BUN BLD-MCNC: 11 MG/DL (ref 7–18)
CALCIUM BLD-MCNC: 8.1 MG/DL (ref 8.5–10.1)
CHLORIDE SERPL-SCNC: 108 MMOL/L (ref 98–112)
CO2 SERPL-SCNC: 31 MMOL/L (ref 21–32)
CREAT BLD-MCNC: 0.64 MG/DL
ERYTHROCYTE [DISTWIDTH] IN BLOOD BY AUTOMATED COUNT: 12.6 %
GFR SERPLBLD BASED ON 1.73 SQ M-ARVRAT: 105 ML/MIN/1.73M2 (ref 60–?)
GLUCOSE BLD-MCNC: 112 MG/DL (ref 70–99)
HCT VFR BLD AUTO: 23.3 %
HGB BLD-MCNC: 7.4 G/DL
HGB BLD-MCNC: 8.3 G/DL
MAGNESIUM SERPL-MCNC: 1.9 MG/DL (ref 1.6–2.6)
MCH RBC QN AUTO: 27.9 PG (ref 26–34)
MCHC RBC AUTO-ENTMCNC: 31.8 G/DL (ref 31–37)
MCV RBC AUTO: 87.9 FL
OSMOLALITY SERPL CALC.SUM OF ELEC: 288 MOSM/KG (ref 275–295)
PLATELET # BLD AUTO: 142 10(3)UL (ref 150–450)
POTASSIUM SERPL-SCNC: 4.3 MMOL/L (ref 3.5–5.1)
RBC # BLD AUTO: 2.65 X10(6)UL
SODIUM SERPL-SCNC: 139 MMOL/L (ref 136–145)
WBC # BLD AUTO: 4.5 X10(3) UL (ref 4–11)

## 2023-01-01 NOTE — DISCHARGE INSTRUCTIONS
*Keep incision sites clean and dry. Monitor for signs of infection like redness,swelling,drainage,foul odor,temperature of > 101 F and notify M.D> as needed  *May Shower  *No lifting > 10 lbs  *No driving for 2 weeks   *Walk as frequently as tolerated in moderation     *For any questions or concerns ,please call M.D.*    Avoid NSAIDs (ibuprofen, aleve, motrin etc) while on blood thinners as they can increase risk for bleed  Repeat hemoglobin with PCP ~1 week. Follow up in ~ 1 week    Follow up with Dr Nataly Turcios in 2-3 weeks.  You have IVC filter that will need to be removed in the upcoming months, timing to be determined by Dr Nataly Turcios

## 2023-01-01 NOTE — PLAN OF CARE
Problem: Patient/Family Goals  Goal: Patient/Family Long Term Goal  Description: Patient's Long Term Goal: Discharge home    Interventions:  - Pain tolerable  - Tolerating diet  - Return to previous ADL's  - See additional Care Plan goals for specific interventions  Outcome: Progressing  Goal: Patient/Family Short Term Goal  Description: Patient's Short Term Goal: Prepare for discharge home    Interventions:   - Advance diet as tolerated  - Transition IV to oral medications  - Encourage ambulation  - See additional Care Plan goals for specific interventions  Outcome: Progressing     Problem: SAFETY ADULT - FALL  Goal: Free from fall injury  Description: INTERVENTIONS:  - Assess pt frequently for physical needs  - Identify cognitive and physical deficits and behaviors that affect risk of falls.   - [place] fall precautions as indicated by assessment.  - Educate pt/family on patient safety including physical limitations  - Instruct pt to call for assistance with activity based on assessment  - Modify environment to reduce risk of injury  - Provide assistive devices as appropriate  - Consider OT/PT consult to assist with strengthening/mobility  - Encourage toileting schedule  Outcome: Progressing     Problem: RESPIRATORY - ADULT  Goal: Achieves optimal ventilation and oxygenation  Description: INTERVENTIONS:  - Assess for changes in respiratory status  - Assess for changes in mentation and behavior  - Position to facilitate oxygenation and minimize respiratory effort  - Oxygen supplementation based on oxygen saturation or ABGs  - Provide Smoking Cessation handout, if applicable  - Encourage broncho-pulmonary hygiene including cough, deep breathe, Incentive Spirometry  - Assess the need for suctioning and perform as needed  - Assess and instruct to report SOB or any respiratory difficulty  - Respiratory Therapy support as indicated  - Manage/alleviate anxiety  - Monitor for signs/symptoms of CO2 retention  Outcome: Progressing     Problem: HEMATOLOGIC - ADULT  Goal: Free from bleeding injury  Description: (Example usage: patient with low platelets)  INTERVENTIONS:  - Avoid intramuscular injections, enemas and rectal medication administration  - Ensure safe mobilization of patient  - Hold pressure on venipuncture sites to achieve adequate hemostasis  - Assess for signs and symptoms of internal bleeding  - Monitor lab trends  - Patient is to report abnormal signs of bleeding to staff  - Avoid use of toothpicks and dental floss  - Use electric shaver for shaving  - Use soft bristle tooth brush  - Limit straining and forceful nose blowing  Outcome: Progressing   Pt is alert and oriented x4. VSS. Maintaining o2 sats on r/a.  NSR, ST on tele. Heparin gtt at at 14mL/hr. Pt having small stools, no blood noted. Tolerating soft diet well. Voiding freely. Mild c/o abdominal pain, declined intervention at this time. 3 lap sites and 1 lower transverse c/d/I w/ skin glue. CHG done. Pt updated w/ poc. Will monitor.

## 2023-01-01 NOTE — PLAN OF CARE
Problem: Patient/Family Goals  Goal: Patient/Family Long Term Goal  Description: Patient's Long Term Goal: Discharge home    Interventions:  - Pain tolerable  - Tolerating diet  - Return to previous ADL's  - See additional Care Plan goals for specific interventions  Outcome: Progressing  Goal: Patient/Family Short Term Goal  Description: Patient's Short Term Goal: Prepare for discharge home    Interventions:   - Advance diet as tolerated  - Transition IV to oral medications  - Encourage ambulation  - See additional Care Plan goals for specific interventions  Outcome: Progressing     Problem: RESPIRATORY - ADULT  Goal: Achieves optimal ventilation and oxygenation  Description: INTERVENTIONS:  - Assess for changes in respiratory status  - Assess for changes in mentation and behavior  - Position to facilitate oxygenation and minimize respiratory effort  - Oxygen supplementation based on oxygen saturation or ABGs  - Provide Smoking Cessation handout, if applicable  - Encourage broncho-pulmonary hygiene including cough, deep breathe, Incentive Spirometry  - Assess the need for suctioning and perform as needed  - Assess and instruct to report SOB or any respiratory difficulty  - Respiratory Therapy support as indicated  - Manage/alleviate anxiety  - Monitor for signs/symptoms of CO2 retention  Outcome: Progressing     Problem: SAFETY ADULT - FALL  Goal: Free from fall injury  Description: INTERVENTIONS:  - Assess pt frequently for physical needs  - Identify cognitive and physical deficits and behaviors that affect risk of falls.   - [place] fall precautions as indicated by assessment.  - Educate pt/family on patient safety including physical limitations  - Instruct pt to call for assistance with activity based on assessment  - Modify environment to reduce risk of injury  - Provide assistive devices as appropriate  - Consider OT/PT consult to assist with strengthening/mobility  - Encourage toileting schedule  Outcome: Progressing     Problem: HEMATOLOGIC - ADULT  Goal: Free from bleeding injury  Description: (Example usage: patient with low platelets)  INTERVENTIONS:  - Avoid intramuscular injections, enemas and rectal medication administration  - Ensure safe mobilization of patient  - Hold pressure on venipuncture sites to achieve adequate hemostasis  - Assess for signs and symptoms of internal bleeding  - Monitor lab trends  - Patient is to report abnormal signs of bleeding to staff  - Avoid use of toothpicks and dental floss  - Use electric shaver for shaving  - Use soft bristle tooth brush  - Limit straining and forceful nose blowing  Outcome: Not Progressing   ALert & oriented X 4. With tolerable pain . Passing flatus. Tolerated diet . Up in room and hallway. Use of I.S. encouraged.  and BETTY Ricks APN aware of latest HGB. Plan of care discussed with patient.

## 2023-01-01 NOTE — PROGRESS NOTES
Recovered from surgery    Ok outpt anticoagulation    She knows to return to hospital if she has any bleeding

## 2023-01-02 VITALS
WEIGHT: 184.5 LBS | HEIGHT: 60 IN | SYSTOLIC BLOOD PRESSURE: 137 MMHG | RESPIRATION RATE: 18 BRPM | OXYGEN SATURATION: 98 % | HEART RATE: 96 BPM | TEMPERATURE: 98 F | DIASTOLIC BLOOD PRESSURE: 63 MMHG | BODY MASS INDEX: 36.22 KG/M2

## 2023-01-02 LAB
ANION GAP SERPL CALC-SCNC: 5 MMOL/L (ref 0–18)
APTT PPP: 29.5 SECONDS (ref 23.3–35.6)
BUN BLD-MCNC: 12 MG/DL (ref 7–18)
CALCIUM BLD-MCNC: 8.5 MG/DL (ref 8.5–10.1)
CHLORIDE SERPL-SCNC: 107 MMOL/L (ref 98–112)
CO2 SERPL-SCNC: 29 MMOL/L (ref 21–32)
CREAT BLD-MCNC: 0.66 MG/DL
ERYTHROCYTE [DISTWIDTH] IN BLOOD BY AUTOMATED COUNT: 12.5 %
GFR SERPLBLD BASED ON 1.73 SQ M-ARVRAT: 104 ML/MIN/1.73M2 (ref 60–?)
GLUCOSE BLD-MCNC: 114 MG/DL (ref 70–99)
HCT VFR BLD AUTO: 22.3 %
HGB BLD-MCNC: 7.1 G/DL
HGB BLD-MCNC: 8.9 G/DL
MAGNESIUM SERPL-MCNC: 1.9 MG/DL (ref 1.6–2.6)
MCH RBC QN AUTO: 28.2 PG (ref 26–34)
MCHC RBC AUTO-ENTMCNC: 31.8 G/DL (ref 31–37)
MCV RBC AUTO: 88.5 FL
OSMOLALITY SERPL CALC.SUM OF ELEC: 293 MOSM/KG (ref 275–295)
PLATELET # BLD AUTO: 175 10(3)UL (ref 150–450)
POTASSIUM SERPL-SCNC: 4.4 MMOL/L (ref 3.5–5.1)
RBC # BLD AUTO: 2.52 X10(6)UL
SODIUM SERPL-SCNC: 141 MMOL/L (ref 136–145)
WBC # BLD AUTO: 4.3 X10(3) UL (ref 4–11)

## 2023-01-02 RX ORDER — ACETAMINOPHEN 325 MG/1
650 TABLET ORAL EVERY 6 HOURS PRN
Status: DISCONTINUED | OUTPATIENT
Start: 2023-01-02 | End: 2023-01-02

## 2023-01-02 NOTE — CM/SW NOTE
CM met with patient, spouse has already picked up Xarelto. Coupon given for next refill. Pt appreciative. No additional needs identified.     Ricardo Michaud, MSN RN, [address]  X [phone]

## 2023-01-02 NOTE — PLAN OF CARE
Problem: Patient/Family Goals  Goal: Patient/Family Long Term Goal  Description: Patient's Long Term Goal: Discharge home    Interventions:  - Pain tolerable  - Tolerating diet  - Return to previous ADL's  - See additional Care Plan goals for specific interventions  Outcome: Progressing  Goal: Patient/Family Short Term Goal  Description: Patient's Short Term Goal: Prepare for discharge home    Interventions:   - Advance diet as tolerated  - Transition IV to oral medications  - Encourage ambulation  - See additional Care Plan goals for specific interventions  Outcome: Progressing     Problem: SAFETY ADULT - FALL  Goal: Free from fall injury  Description: INTERVENTIONS:  - Assess pt frequently for physical needs  - Identify cognitive and physical deficits and behaviors that affect risk of falls.   - [place] fall precautions as indicated by assessment.  - Educate pt/family on patient safety including physical limitations  - Instruct pt to call for assistance with activity based on assessment  - Modify environment to reduce risk of injury  - Provide assistive devices as appropriate  - Consider OT/PT consult to assist with strengthening/mobility  - Encourage toileting schedule  Outcome: Progressing     Problem: RESPIRATORY - ADULT  Goal: Achieves optimal ventilation and oxygenation  Description: INTERVENTIONS:  - Assess for changes in respiratory status  - Assess for changes in mentation and behavior  - Position to facilitate oxygenation and minimize respiratory effort  - Oxygen supplementation based on oxygen saturation or ABGs  - Provide Smoking Cessation handout, if applicable  - Encourage broncho-pulmonary hygiene including cough, deep breathe, Incentive Spirometry  - Assess the need for suctioning and perform as needed  - Assess and instruct to report SOB or any respiratory difficulty  - Respiratory Therapy support as indicated  - Manage/alleviate anxiety  - Monitor for signs/symptoms of CO2 retention  Outcome: Progressing     Problem: HEMATOLOGIC - ADULT  Goal: Free from bleeding injury  Description: (Example usage: patient with low platelets)  INTERVENTIONS:  - Avoid intramuscular injections, enemas and rectal medication administration  - Ensure safe mobilization of patient  - Hold pressure on venipuncture sites to achieve adequate hemostasis  - Assess for signs and symptoms of internal bleeding  - Monitor lab trends  - Patient is to report abnormal signs of bleeding to staff  - Avoid use of toothpicks and dental floss  - Use electric shaver for shaving  - Use soft bristle tooth brush  - Limit straining and forceful nose blowing  Outcome: Progressing

## 2023-01-02 NOTE — PLAN OF CARE
NURSING DISCHARGE NOTE    Discharge instructions given. All questions answered to pt and family satisfaction. IV and midline removed and intact. Discharged Home via Ambulatory. Accompanied by Spouse and Support staff  Belongings Taken by patient/family.

## 2023-01-02 NOTE — PLAN OF CARE
A & O x 4. Room Air. Pain in abdomen. Tolerating diet, denies nausea, reports belching. Up ad toma. RT & Lt arm precautions. Rt midline. Problem: Patient/Family Goals  Goal: Patient/Family Long Term Goal  Description: Patient's Long Term Goal: Discharge home    Interventions:  - Pain tolerable  - Tolerating diet  - Return to previous ADL's  - See additional Care Plan goals for specific interventions  Outcome: Progressing  Goal: Patient/Family Short Term Goal  Description: Patient's Short Term Goal: Prepare for discharge home    Interventions:   - Advance diet as tolerated  - Transition IV to oral medications  - Encourage ambulation  - See additional Care Plan goals for specific interventions  Outcome: Progressing     Problem: SAFETY ADULT - FALL  Goal: Free from fall injury  Description: INTERVENTIONS:  - Assess pt frequently for physical needs  - Identify cognitive and physical deficits and behaviors that affect risk of falls.   - [place] fall precautions as indicated by assessment.  - Educate pt/family on patient safety including physical limitations  - Instruct pt to call for assistance with activity based on assessment  - Modify environment to reduce risk of injury  - Provide assistive devices as appropriate  - Consider OT/PT consult to assist with strengthening/mobility  - Encourage toileting schedule  Outcome: Progressing     Problem: RESPIRATORY - ADULT  Goal: Achieves optimal ventilation and oxygenation  Description: INTERVENTIONS:  - Assess for changes in respiratory status  - Assess for changes in mentation and behavior  - Position to facilitate oxygenation and minimize respiratory effort  - Oxygen supplementation based on oxygen saturation or ABGs  - Provide Smoking Cessation handout, if applicable  - Encourage broncho-pulmonary hygiene including cough, deep breathe, Incentive Spirometry  - Assess the need for suctioning and perform as needed  - Assess and instruct to report SOB or any respiratory difficulty  - Respiratory Therapy support as indicated  - Manage/alleviate anxiety  - Monitor for signs/symptoms of CO2 retention  Outcome: Progressing     Problem: HEMATOLOGIC - ADULT  Goal: Free from bleeding injury  Description: (Example usage: patient with low platelets)  INTERVENTIONS:  - Avoid intramuscular injections, enemas and rectal medication administration  - Ensure safe mobilization of patient  - Hold pressure on venipuncture sites to achieve adequate hemostasis  - Assess for signs and symptoms of internal bleeding  - Monitor lab trends  - Patient is to report abnormal signs of bleeding to staff  - Avoid use of toothpicks and dental floss  - Use electric shaver for shaving  - Use soft bristle tooth brush  - Limit straining and forceful nose blowing  Outcome: Progressing

## 2023-01-02 NOTE — PROGRESS NOTES
Patient 's heart rate goes up to 130's-140's at times when up and walking,no other symptoms-- notified.

## 2023-01-03 NOTE — PAYOR COMM NOTE
Discharge Notification    Patient Name: Hayde Benoit O  Subscriber #: [id number]  Authorization Number: Cassidarinel Pemberton Date/Time: 12/23/2022 4:56 PM  Discharge Date/Time: 1/2/2023 2:55 PM

## 2023-01-09 LAB
MISMATCH REPAIR BY IHC RESULT: NORMAL
MISMATCH REPAIR BY IHC W/MLH1: NORMAL
MISMATCH REPAIR BY IHC W/MSH2: NORMAL
MISMATCH REPAIR BY IHC W/MSH6: NORMAL
MISMATCH REPAIR BY IHC W/PMS2: NORMAL

## 2023-01-25 ENCOUNTER — APPOINTMENT (OUTPATIENT)
Dept: GENERAL RADIOLOGY | Facility: HOSPITAL | Age: 53
End: 2023-01-25
Attending: SURGERY
Payer: COMMERCIAL

## 2023-01-25 ENCOUNTER — HOSPITAL ENCOUNTER (OUTPATIENT)
Facility: HOSPITAL | Age: 53
Setting detail: HOSPITAL OUTPATIENT SURGERY
Discharge: HOME | End: 2023-01-25
Attending: SURGERY | Admitting: SURGERY
Payer: COMMERCIAL

## 2023-01-25 ENCOUNTER — ANESTHESIA (OUTPATIENT)
Dept: SURGERY | Facility: HOSPITAL | Age: 53
End: 2023-01-25
Payer: COMMERCIAL

## 2023-01-25 ENCOUNTER — ANESTHESIA EVENT (OUTPATIENT)
Dept: SURGERY | Facility: HOSPITAL | Age: 53
End: 2023-01-25
Payer: COMMERCIAL

## 2023-01-25 VITALS
SYSTOLIC BLOOD PRESSURE: 137 MMHG | RESPIRATION RATE: 18 BRPM | BODY MASS INDEX: 34.82 KG/M2 | HEIGHT: 61 IN | DIASTOLIC BLOOD PRESSURE: 66 MMHG | OXYGEN SATURATION: 100 % | TEMPERATURE: 98 F | HEART RATE: 76 BPM | WEIGHT: 184.44 LBS

## 2023-01-25 DIAGNOSIS — C18.8 OVERLAPPING MALIGNANT NEOPLASM OF COLON (HCC): Primary | ICD-10-CM

## 2023-01-25 LAB — B-HCG UR QL: NEGATIVE

## 2023-01-25 PROCEDURE — 81025 URINE PREGNANCY TEST: CPT

## 2023-01-25 PROCEDURE — 0JH63XZ INSERTION OF TUNNELED VASCULAR ACCESS DEVICE INTO CHEST SUBCUTANEOUS TISSUE AND FASCIA, PERCUTANEOUS APPROACH: ICD-10-PCS | Performed by: SURGERY

## 2023-01-25 PROCEDURE — 77001 FLUOROGUIDE FOR VEIN DEVICE: CPT | Performed by: SURGERY

## 2023-01-25 DEVICE — POWERPORT CLEARVUE ISP WITH SMOOTH SEPTUM, 8F POLYURETHANE CATHETER, INTERMEDIATE KIT (STEALTH)
Type: IMPLANTABLE DEVICE | Site: CHEST | Status: FUNCTIONAL
Brand: POWERPORT CLEARVUE

## 2023-01-25 RX ORDER — HYDROMORPHONE HYDROCHLORIDE 1 MG/ML
0.4 INJECTION, SOLUTION INTRAMUSCULAR; INTRAVENOUS; SUBCUTANEOUS EVERY 5 MIN PRN
Status: DISCONTINUED | OUTPATIENT
Start: 2023-01-25 | End: 2023-01-25

## 2023-01-25 RX ORDER — ONDANSETRON 2 MG/ML
4 INJECTION INTRAMUSCULAR; INTRAVENOUS EVERY 6 HOURS PRN
Status: DISCONTINUED | OUTPATIENT
Start: 2023-01-25 | End: 2023-01-25

## 2023-01-25 RX ORDER — TRAMADOL HYDROCHLORIDE 50 MG/1
TABLET ORAL EVERY 6 HOURS PRN
Qty: 20 TABLET | Refills: 0 | Status: SHIPPED | OUTPATIENT
Start: 2023-01-25

## 2023-01-25 RX ORDER — LIDOCAINE HYDROCHLORIDE 10 MG/ML
INJECTION, SOLUTION EPIDURAL; INFILTRATION; INTRACAUDAL; PERINEURAL AS NEEDED
Status: DISCONTINUED | OUTPATIENT
Start: 2023-01-25 | End: 2023-01-25 | Stop reason: SURG

## 2023-01-25 RX ORDER — HYDROMORPHONE HYDROCHLORIDE 1 MG/ML
0.2 INJECTION, SOLUTION INTRAMUSCULAR; INTRAVENOUS; SUBCUTANEOUS EVERY 5 MIN PRN
Status: DISCONTINUED | OUTPATIENT
Start: 2023-01-25 | End: 2023-01-25

## 2023-01-25 RX ORDER — TRAMADOL HYDROCHLORIDE 50 MG/1
50 TABLET ORAL EVERY 6 HOURS PRN
Qty: 30 TABLET | Refills: 0 | Status: SHIPPED | OUTPATIENT
Start: 2023-01-25 | End: 2023-01-25

## 2023-01-25 RX ORDER — ACETAMINOPHEN 500 MG
1000 TABLET ORAL ONCE
Status: DISCONTINUED | OUTPATIENT
Start: 2023-01-25 | End: 2023-01-25 | Stop reason: HOSPADM

## 2023-01-25 RX ORDER — SODIUM CHLORIDE, SODIUM LACTATE, POTASSIUM CHLORIDE, CALCIUM CHLORIDE 600; 310; 30; 20 MG/100ML; MG/100ML; MG/100ML; MG/100ML
INJECTION, SOLUTION INTRAVENOUS CONTINUOUS
Status: DISCONTINUED | OUTPATIENT
Start: 2023-01-25 | End: 2023-01-25

## 2023-01-25 RX ORDER — LIDOCAINE HYDROCHLORIDE AND EPINEPHRINE 10; 10 MG/ML; UG/ML
INJECTION, SOLUTION INFILTRATION; PERINEURAL AS NEEDED
Status: DISCONTINUED | OUTPATIENT
Start: 2023-01-25 | End: 2023-01-25 | Stop reason: HOSPADM

## 2023-01-25 RX ORDER — HYDROCODONE BITARTRATE AND ACETAMINOPHEN 5; 325 MG/1; MG/1
1 TABLET ORAL ONCE AS NEEDED
Status: DISCONTINUED | OUTPATIENT
Start: 2023-01-25 | End: 2023-01-25

## 2023-01-25 RX ORDER — SCOLOPAMINE TRANSDERMAL SYSTEM 1 MG/1
1 PATCH, EXTENDED RELEASE TRANSDERMAL ONCE
Status: DISCONTINUED | OUTPATIENT
Start: 2023-01-25 | End: 2023-01-25 | Stop reason: HOSPADM

## 2023-01-25 RX ORDER — BUPIVACAINE HYDROCHLORIDE 5 MG/ML
INJECTION, SOLUTION EPIDURAL; INTRACAUDAL AS NEEDED
Status: DISCONTINUED | OUTPATIENT
Start: 2023-01-25 | End: 2023-01-25 | Stop reason: HOSPADM

## 2023-01-25 RX ORDER — NALOXONE HYDROCHLORIDE 0.4 MG/ML
80 INJECTION, SOLUTION INTRAMUSCULAR; INTRAVENOUS; SUBCUTANEOUS AS NEEDED
Status: DISCONTINUED | OUTPATIENT
Start: 2023-01-25 | End: 2023-01-25

## 2023-01-25 RX ORDER — ONDANSETRON 2 MG/ML
INJECTION INTRAMUSCULAR; INTRAVENOUS AS NEEDED
Status: DISCONTINUED | OUTPATIENT
Start: 2023-01-25 | End: 2023-01-25 | Stop reason: SURG

## 2023-01-25 RX ORDER — ACETAMINOPHEN 500 MG
1000 TABLET ORAL ONCE AS NEEDED
Status: DISCONTINUED | OUTPATIENT
Start: 2023-01-25 | End: 2023-01-25

## 2023-01-25 RX ORDER — LABETALOL HYDROCHLORIDE 5 MG/ML
5 INJECTION, SOLUTION INTRAVENOUS EVERY 5 MIN PRN
Status: DISCONTINUED | OUTPATIENT
Start: 2023-01-25 | End: 2023-01-25

## 2023-01-25 RX ORDER — MEPERIDINE HYDROCHLORIDE 25 MG/ML
12.5 INJECTION INTRAMUSCULAR; INTRAVENOUS; SUBCUTANEOUS AS NEEDED
Status: DISCONTINUED | OUTPATIENT
Start: 2023-01-25 | End: 2023-01-25

## 2023-01-25 RX ORDER — HYDROMORPHONE HYDROCHLORIDE 1 MG/ML
0.6 INJECTION, SOLUTION INTRAMUSCULAR; INTRAVENOUS; SUBCUTANEOUS EVERY 5 MIN PRN
Status: DISCONTINUED | OUTPATIENT
Start: 2023-01-25 | End: 2023-01-25

## 2023-01-25 RX ORDER — DIPHENHYDRAMINE HYDROCHLORIDE 50 MG/ML
12.5 INJECTION INTRAMUSCULAR; INTRAVENOUS AS NEEDED
Status: DISCONTINUED | OUTPATIENT
Start: 2023-01-25 | End: 2023-01-25

## 2023-01-25 RX ORDER — PROCHLORPERAZINE EDISYLATE 5 MG/ML
5 INJECTION INTRAMUSCULAR; INTRAVENOUS EVERY 8 HOURS PRN
Status: DISCONTINUED | OUTPATIENT
Start: 2023-01-25 | End: 2023-01-25

## 2023-01-25 RX ORDER — CEFAZOLIN SODIUM/WATER 2 G/20 ML
2 SYRINGE (ML) INTRAVENOUS ONCE
Status: COMPLETED | OUTPATIENT
Start: 2023-01-25 | End: 2023-01-25

## 2023-01-25 RX ORDER — MIDAZOLAM HYDROCHLORIDE 1 MG/ML
INJECTION INTRAMUSCULAR; INTRAVENOUS AS NEEDED
Status: DISCONTINUED | OUTPATIENT
Start: 2023-01-25 | End: 2023-01-25 | Stop reason: SURG

## 2023-01-25 RX ORDER — HYDROCODONE BITARTRATE AND ACETAMINOPHEN 5; 325 MG/1; MG/1
2 TABLET ORAL ONCE AS NEEDED
Status: DISCONTINUED | OUTPATIENT
Start: 2023-01-25 | End: 2023-01-25

## 2023-01-25 RX ADMIN — MIDAZOLAM HYDROCHLORIDE 2 MG: 1 INJECTION INTRAMUSCULAR; INTRAVENOUS at 12:40:00

## 2023-01-25 RX ADMIN — MIDAZOLAM HYDROCHLORIDE 2 MG: 1 INJECTION INTRAMUSCULAR; INTRAVENOUS at 12:45:00

## 2023-01-25 RX ADMIN — CEFAZOLIN SODIUM/WATER 2 G: 2 G/20 ML SYRINGE (ML) INTRAVENOUS at 12:43:00

## 2023-01-25 RX ADMIN — SODIUM CHLORIDE, SODIUM LACTATE, POTASSIUM CHLORIDE, CALCIUM CHLORIDE: 600; 310; 30; 20 INJECTION, SOLUTION INTRAVENOUS at 12:40:00

## 2023-01-25 RX ADMIN — ONDANSETRON 4 MG: 2 INJECTION INTRAMUSCULAR; INTRAVENOUS at 12:44:00

## 2023-01-25 RX ADMIN — LIDOCAINE HYDROCHLORIDE 50 MG: 10 INJECTION, SOLUTION EPIDURAL; INFILTRATION; INTRACAUDAL; PERINEURAL at 12:48:00

## 2023-01-25 NOTE — OPERATIVE REPORT
[address]                                                         Operative Note    Aren Lauren Location: [facility]   CSN [id number] MRN [id number]   Admission Date 1/25/2023 Procedure Date 1/25/2023   Attending Physician Dave Kim MD Procedure Physician Amira Kelly MD     Pre-Operative Diagnosis: COLON CANCER     Post-Operative Diagnosis: COLON CANCER    Procedure Performed: [address] WITH FLUOROSCOPIC GUIDANCE    Surgeon: Amira Kelly MD     Assistant(s):  Surgical Assistant.: ISABEL Billy       Anesthesia: MAC       Complications: none       Estimated Blood Loss: Blood Output: 2 mL (1/25/2023  1:06 PM)              Operative Summary: Patient was taken to the operating room placed in a Trendelenburg position. There are prepped and draped in usual fashion after being placed under sedation. The LEFT subclavian vein was cannulated with a needle percutaneously and a Glidewire was inserted and advanced to the distal superior vena cava. This was confirmed with fluoroscopy. A subcutaneous pocket was then created just below the entrance site of the wire in preparation for the port placement. A dilator introducer sheath was placed over the wire using the Seldinger technique followed by removing the dilator and advancing the 8 Tamazight Bard catheter through the sheath to the distal SVC. This is well was confirmed with fluoroscopy. The catheter was then subcutaneously tunneled and exited out of the pocket site. The catheter was then cut to length and attached to the Bard hub. Heparinized saline was utilized to flush the catheter with aspiration without abnormality. The wound was closed with absorbable suture in 2 layers. Sterile dressing was applied. The patient tolerated the procedure well there are no complications during the case.   They are awoken taken to recovery in satisfactory condition          Amira Kelly MD  1/25/2023  1:11 PM

## 2023-05-31 ENCOUNTER — APPOINTMENT (OUTPATIENT)
Dept: GENERAL RADIOLOGY | Age: 53
End: 2023-05-31
Attending: NURSE PRACTITIONER
Payer: COMMERCIAL

## 2023-05-31 ENCOUNTER — HOSPITAL ENCOUNTER (OUTPATIENT)
Age: 53
Discharge: HOME | End: 2023-05-31
Payer: COMMERCIAL

## 2023-05-31 ENCOUNTER — APPOINTMENT (OUTPATIENT)
Dept: ULTRASOUND IMAGING | Age: 53
End: 2023-05-31
Attending: NURSE PRACTITIONER
Payer: COMMERCIAL

## 2023-05-31 VITALS
RESPIRATION RATE: 18 BRPM | HEART RATE: 97 BPM | SYSTOLIC BLOOD PRESSURE: 140 MMHG | DIASTOLIC BLOOD PRESSURE: 88 MMHG | TEMPERATURE: 98 F | OXYGEN SATURATION: 96 % | HEIGHT: 61 IN | BODY MASS INDEX: 33.99 KG/M2 | WEIGHT: 180 LBS

## 2023-05-31 DIAGNOSIS — M79.672 LEFT FOOT PAIN: ICD-10-CM

## 2023-05-31 DIAGNOSIS — L25.9 CONTACT DERMATITIS, UNSPECIFIED CONTACT DERMATITIS TYPE, UNSPECIFIED TRIGGER: Primary | ICD-10-CM

## 2023-05-31 PROCEDURE — 93971 EXTREMITY STUDY: CPT | Performed by: NURSE PRACTITIONER

## 2023-05-31 PROCEDURE — 99214 OFFICE O/P EST MOD 30 MIN: CPT

## 2023-05-31 PROCEDURE — 99215 OFFICE O/P EST HI 40 MIN: CPT

## 2023-05-31 PROCEDURE — 73630 X-RAY EXAM OF FOOT: CPT | Performed by: NURSE PRACTITIONER

## 2023-05-31 RX ORDER — PROCHLORPERAZINE MALEATE 5 MG/1
5 TABLET ORAL EVERY 6 HOURS PRN
COMMUNITY

## 2023-05-31 RX ORDER — ANASTROZOLE 1 MG/1
1 TABLET ORAL DAILY
COMMUNITY

## 2023-05-31 RX ORDER — ONDANSETRON 8 MG/1
8 TABLET, ORALLY DISINTEGRATING ORAL EVERY 8 HOURS PRN
COMMUNITY

## 2023-05-31 RX ORDER — OLANZAPINE 2.5 MG/1
2.5 TABLET ORAL NIGHTLY
COMMUNITY

## 2023-05-31 NOTE — ED INITIAL ASSESSMENT (HPI)
Patient presents to IC with c/o swelling and discoloration to left leg x 3-4 days. Reports pain to base of toes left foot. h/o DVT/PE(bilateral) Dec 22. On xarelto. Denies sob.

## 2023-05-31 NOTE — ED NOTES
Ultrasound revealed partially occlusive thrombus, did not appear acute per radiologist.  Likely residual.  The patient is currently on Xarelto which she was instructed to continue as previous. Patient complained of pain between the left second and third toe, on exam patient does have tenderness with palpation of this area, there is no erythema or swelling. 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 Patient does have slightly discolored skin to the medial aspect of the left lower leg, there is no tenderness or warmth, etiology unclear, possible contact dermatitis.   We will follow-up with primary care, return if any change or worsening symptoms

## 2024-01-23 ENCOUNTER — HOSPITAL ENCOUNTER (EMERGENCY)
Facility: HOSPITAL | Age: 54
Discharge: HOME | End: 2024-01-23
Attending: EMERGENCY MEDICINE
Payer: COMMERCIAL

## 2024-01-23 ENCOUNTER — APPOINTMENT (OUTPATIENT)
Dept: CT IMAGING | Facility: HOSPITAL | Age: 54
End: 2024-01-23
Attending: EMERGENCY MEDICINE
Payer: COMMERCIAL

## 2024-01-23 VITALS
OXYGEN SATURATION: 100 % | BODY MASS INDEX: 39.27 KG/M2 | HEART RATE: 109 BPM | RESPIRATION RATE: 13 BRPM | WEIGHT: 200 LBS | TEMPERATURE: 98 F | SYSTOLIC BLOOD PRESSURE: 133 MMHG | HEIGHT: 60 IN | DIASTOLIC BLOOD PRESSURE: 77 MMHG

## 2024-01-23 DIAGNOSIS — D69.6 THROMBOCYTOPENIA (HCC): ICD-10-CM

## 2024-01-23 DIAGNOSIS — R10.11 ABDOMINAL PAIN, RIGHT UPPER QUADRANT: Primary | ICD-10-CM

## 2024-01-23 LAB
ALBUMIN SERPL-MCNC: 4.3 G/DL (ref 3.4–5)
ALBUMIN/GLOB SERPL: 1 {RATIO} (ref 1–2)
ALP LIVER SERPL-CCNC: 118 U/L
ANION GAP SERPL CALC-SCNC: 5 MMOL/L (ref 0–18)
APTT PPP: 28.1 SECONDS (ref 23.3–35.6)
AST SERPL-CCNC: 37 U/L (ref 15–37)
ATRIAL RATE: 111 BPM
B-HCG UR QL: NEGATIVE
BASOPHILS # BLD AUTO: 0.02 X10(3) UL (ref 0–0.2)
BASOPHILS NFR BLD AUTO: 0.3 %
BILIRUB SERPL-MCNC: 0.8 MG/DL (ref 0.1–2)
BILIRUB UR QL STRIP.AUTO: NEGATIVE
BUN BLD-MCNC: 14 MG/DL (ref 9–23)
CALCIUM BLD-MCNC: 9.7 MG/DL (ref 8.5–10.1)
CHLORIDE SERPL-SCNC: 108 MMOL/L (ref 98–112)
CLARITY UR REFRACT.AUTO: CLEAR
CO2 SERPL-SCNC: 26 MMOL/L (ref 21–32)
COLOR UR AUTO: YELLOW
CREAT BLD-MCNC: 0.87 MG/DL
EGFRCR SERPLBLD CKD-EPI 2021: 79 ML/MIN/1.73M2 (ref 60–?)
EOSINOPHIL # BLD AUTO: 0.04 X10(3) UL (ref 0–0.7)
EOSINOPHIL NFR BLD AUTO: 0.6 %
ERYTHROCYTE [DISTWIDTH] IN BLOOD BY AUTOMATED COUNT: 12.6 %
GLOBULIN PLAS-MCNC: 4.1 G/DL (ref 2.8–4.4)
GLUCOSE BLD-MCNC: 129 MG/DL (ref 70–99)
GLUCOSE UR STRIP.AUTO-MCNC: NORMAL MG/DL
HCT VFR BLD AUTO: 42.5 %
HGB BLD-MCNC: 14.5 G/DL
IMM GRANULOCYTES # BLD AUTO: 0.01 X10(3) UL (ref 0–1)
IMM GRANULOCYTES NFR BLD: 0.2 %
INR BLD: 1.13 (ref 0.8–1.2)
KETONES UR STRIP.AUTO-MCNC: NEGATIVE MG/DL
LEUKOCYTE ESTERASE UR QL STRIP.AUTO: NEGATIVE
LIPASE SERPL-CCNC: 27 U/L (ref 13–75)
LYMPHOCYTES # BLD AUTO: 1.31 X10(3) UL (ref 1–4)
LYMPHOCYTES NFR BLD AUTO: 19.8 %
MCH RBC QN AUTO: 29.1 PG (ref 26–34)
MCHC RBC AUTO-ENTMCNC: 34.1 G/DL (ref 31–37)
MCV RBC AUTO: 85.2 FL
MONOCYTES # BLD AUTO: 0.43 X10(3) UL (ref 0.1–1)
MONOCYTES NFR BLD AUTO: 6.5 %
NEUTROPHILS # BLD AUTO: 4.79 X10 (3) UL (ref 1.5–7.7)
NEUTROPHILS # BLD AUTO: 4.79 X10(3) UL (ref 1.5–7.7)
NEUTROPHILS NFR BLD AUTO: 72.6 %
NITRITE UR QL STRIP.AUTO: NEGATIVE
OSMOLALITY SERPL CALC.SUM OF ELEC: 290 MOSM/KG (ref 275–295)
P AXIS: 58 DEGREES
P-R INTERVAL: 186 MS
PH UR STRIP.AUTO: 6 [PH] (ref 5–8)
PLATELET # BLD AUTO: 77 10(3)UL (ref 150–450)
POTASSIUM SERPL-SCNC: 4.3 MMOL/L (ref 3.5–5.1)
PROT SERPL-MCNC: 8.4 G/DL (ref 6.4–8.2)
PROT UR STRIP.AUTO-MCNC: NEGATIVE MG/DL
PROTHROMBIN TIME: 14.6 SECONDS (ref 11.6–14.8)
Q-T INTERVAL: 324 MS
QRS DURATION: 80 MS
QTC CALCULATION (BEZET): 440 MS
R AXIS: 15 DEGREES
RBC # BLD AUTO: 4.99 X10(6)UL
RBC UR QL AUTO: NEGATIVE
SODIUM SERPL-SCNC: 139 MMOL/L (ref 136–145)
SP GR UR STRIP.AUTO: 1.02 (ref 1–1.03)
T AXIS: 19 DEGREES
UROBILINOGEN UR STRIP.AUTO-MCNC: NORMAL MG/DL
VENTRICULAR RATE: 111 BPM
WBC # BLD AUTO: 6.6 X10(3) UL (ref 4–11)

## 2024-01-23 PROCEDURE — 96360 HYDRATION IV INFUSION INIT: CPT

## 2024-01-23 PROCEDURE — 81025 URINE PREGNANCY TEST: CPT

## 2024-01-23 PROCEDURE — 71275 CT ANGIOGRAPHY CHEST: CPT | Performed by: EMERGENCY MEDICINE

## 2024-01-23 PROCEDURE — 85610 PROTHROMBIN TIME: CPT | Performed by: EMERGENCY MEDICINE

## 2024-01-23 PROCEDURE — 85025 COMPLETE CBC W/AUTO DIFF WBC: CPT | Performed by: EMERGENCY MEDICINE

## 2024-01-23 PROCEDURE — 74177 CT ABD & PELVIS W/CONTRAST: CPT | Performed by: EMERGENCY MEDICINE

## 2024-01-23 PROCEDURE — 81003 URINALYSIS AUTO W/O SCOPE: CPT | Performed by: EMERGENCY MEDICINE

## 2024-01-23 PROCEDURE — 99285 EMERGENCY DEPT VISIT HI MDM: CPT

## 2024-01-23 PROCEDURE — 96361 HYDRATE IV INFUSION ADD-ON: CPT

## 2024-01-23 PROCEDURE — 93010 ELECTROCARDIOGRAM REPORT: CPT

## 2024-01-23 PROCEDURE — 83690 ASSAY OF LIPASE: CPT | Performed by: EMERGENCY MEDICINE

## 2024-01-23 PROCEDURE — 93005 ELECTROCARDIOGRAM TRACING: CPT

## 2024-01-23 PROCEDURE — 80053 COMPREHEN METABOLIC PANEL: CPT | Performed by: EMERGENCY MEDICINE

## 2024-01-23 PROCEDURE — 85730 THROMBOPLASTIN TIME PARTIAL: CPT | Performed by: EMERGENCY MEDICINE

## 2024-01-23 RX ORDER — SODIUM CHLORIDE 9 MG/ML
1000 INJECTION, SOLUTION INTRAVENOUS ONCE
Status: COMPLETED | OUTPATIENT
Start: 2024-01-23 | End: 2024-01-23

## 2024-01-23 RX ORDER — IOHEXOL 350 MG/ML
100 INJECTION, SOLUTION INTRAVENOUS
Status: COMPLETED | OUTPATIENT
Start: 2024-01-23 | End: 2024-01-23

## 2024-01-23 NOTE — DISCHARGE INSTRUCTIONS
Tylenol for pain at home  Encourage a bland diet at home  Return to the ER if symptoms worsen or if any other problems arise

## 2024-01-23 NOTE — ED INITIAL ASSESSMENT (HPI)
Pt presents to the ED with c/o right sided abd pain for past couple of days. Pt denies N/V. Pt denies urinary complaints. Pt reports decreased BM. Pt awake and alert, skin w/d,resps reg/unlabored.

## 2024-06-10 ENCOUNTER — APPOINTMENT (OUTPATIENT)
Dept: MRI IMAGING | Facility: HOSPITAL | Age: 54
End: 2024-06-10
Attending: EMERGENCY MEDICINE
Payer: COMMERCIAL

## 2024-06-10 ENCOUNTER — APPOINTMENT (OUTPATIENT)
Dept: CT IMAGING | Facility: HOSPITAL | Age: 54
End: 2024-06-10
Attending: EMERGENCY MEDICINE
Payer: COMMERCIAL

## 2024-06-10 ENCOUNTER — HOSPITAL ENCOUNTER (EMERGENCY)
Facility: HOSPITAL | Age: 54
Discharge: HOME | End: 2024-06-11
Attending: EMERGENCY MEDICINE
Payer: COMMERCIAL

## 2024-06-10 VITALS
RESPIRATION RATE: 19 BRPM | HEART RATE: 92 BPM | HEIGHT: 61 IN | BODY MASS INDEX: 38.89 KG/M2 | SYSTOLIC BLOOD PRESSURE: 139 MMHG | OXYGEN SATURATION: 99 % | DIASTOLIC BLOOD PRESSURE: 79 MMHG | TEMPERATURE: 98 F | WEIGHT: 206 LBS

## 2024-06-10 DIAGNOSIS — R20.2 PARESTHESIAS: Primary | ICD-10-CM

## 2024-06-10 DIAGNOSIS — N28.9 RENAL INSUFFICIENCY: ICD-10-CM

## 2024-06-10 LAB
ALBUMIN SERPL-MCNC: 4 G/DL (ref 3.4–5)
ALBUMIN/GLOB SERPL: 1.1 {RATIO} (ref 1–2)
ALP LIVER SERPL-CCNC: 101 U/L
ALT SERPL-CCNC: 53 U/L
ANION GAP SERPL CALC-SCNC: 7 MMOL/L (ref 0–18)
AST SERPL-CCNC: 34 U/L (ref 15–37)
BASOPHILS # BLD AUTO: 0.03 X10(3) UL (ref 0–0.2)
BASOPHILS NFR BLD AUTO: 0.5 %
BILIRUB SERPL-MCNC: 0.5 MG/DL (ref 0.1–2)
BUN BLD-MCNC: 16 MG/DL (ref 9–23)
CALCIUM BLD-MCNC: 9.5 MG/DL (ref 8.5–10.1)
CHLORIDE SERPL-SCNC: 105 MMOL/L (ref 98–112)
CO2 SERPL-SCNC: 27 MMOL/L (ref 21–32)
CREAT BLD-MCNC: 1.04 MG/DL
EGFRCR SERPLBLD CKD-EPI 2021: 63 ML/MIN/1.73M2 (ref 60–?)
EOSINOPHIL # BLD AUTO: 0.07 X10(3) UL (ref 0–0.7)
EOSINOPHIL NFR BLD AUTO: 1.1 %
ERYTHROCYTE [DISTWIDTH] IN BLOOD BY AUTOMATED COUNT: 12.8 %
GLOBULIN PLAS-MCNC: 3.8 G/DL (ref 2.8–4.4)
GLUCOSE BLD-MCNC: 138 MG/DL (ref 70–99)
HCT VFR BLD AUTO: 39.4 %
HGB BLD-MCNC: 13.6 G/DL
IMM GRANULOCYTES # BLD AUTO: 0.02 X10(3) UL (ref 0–1)
IMM GRANULOCYTES NFR BLD: 0.3 %
LYMPHOCYTES # BLD AUTO: 1.54 X10(3) UL (ref 1–4)
LYMPHOCYTES NFR BLD AUTO: 25.1 %
MAGNESIUM SERPL-MCNC: 2 MG/DL (ref 1.6–2.6)
MCH RBC QN AUTO: 29.8 PG (ref 26–34)
MCHC RBC AUTO-ENTMCNC: 34.5 G/DL (ref 31–37)
MCV RBC AUTO: 86.4 FL
MONOCYTES # BLD AUTO: 0.5 X10(3) UL (ref 0.1–1)
MONOCYTES NFR BLD AUTO: 8.2 %
NEUTROPHILS # BLD AUTO: 3.97 X10 (3) UL (ref 1.5–7.7)
NEUTROPHILS # BLD AUTO: 3.97 X10(3) UL (ref 1.5–7.7)
NEUTROPHILS NFR BLD AUTO: 64.8 %
OSMOLALITY SERPL CALC.SUM OF ELEC: 291 MOSM/KG (ref 275–295)
PLATELET # BLD AUTO: 162 10(3)UL (ref 150–450)
POTASSIUM SERPL-SCNC: 3.6 MMOL/L (ref 3.5–5.1)
PROT SERPL-MCNC: 7.8 G/DL (ref 6.4–8.2)
RBC # BLD AUTO: 4.56 X10(6)UL
SODIUM SERPL-SCNC: 139 MMOL/L (ref 136–145)
WBC # BLD AUTO: 6.1 X10(3) UL (ref 4–11)

## 2024-06-10 PROCEDURE — 80053 COMPREHEN METABOLIC PANEL: CPT | Performed by: EMERGENCY MEDICINE

## 2024-06-10 PROCEDURE — 70450 CT HEAD/BRAIN W/O DYE: CPT | Performed by: EMERGENCY MEDICINE

## 2024-06-10 PROCEDURE — 99285 EMERGENCY DEPT VISIT HI MDM: CPT

## 2024-06-10 PROCEDURE — 85025 COMPLETE CBC W/AUTO DIFF WBC: CPT | Performed by: EMERGENCY MEDICINE

## 2024-06-10 PROCEDURE — 96361 HYDRATE IV INFUSION ADD-ON: CPT

## 2024-06-10 PROCEDURE — 83735 ASSAY OF MAGNESIUM: CPT | Performed by: EMERGENCY MEDICINE

## 2024-06-10 PROCEDURE — 99284 EMERGENCY DEPT VISIT MOD MDM: CPT

## 2024-06-10 PROCEDURE — 70551 MRI BRAIN STEM W/O DYE: CPT | Performed by: EMERGENCY MEDICINE

## 2024-06-10 PROCEDURE — 96375 TX/PRO/DX INJ NEW DRUG ADDON: CPT

## 2024-06-10 PROCEDURE — 96374 THER/PROPH/DIAG INJ IV PUSH: CPT

## 2024-06-10 RX ORDER — ONDANSETRON 2 MG/ML
INJECTION INTRAMUSCULAR; INTRAVENOUS
Status: COMPLETED
Start: 2024-06-10 | End: 2024-06-10

## 2024-06-10 RX ORDER — DIPHENHYDRAMINE HYDROCHLORIDE 50 MG/ML
50 INJECTION INTRAMUSCULAR; INTRAVENOUS ONCE
Status: COMPLETED | OUTPATIENT
Start: 2024-06-10 | End: 2024-06-10

## 2024-06-10 RX ORDER — LORAZEPAM 1 MG/1
2 TABLET ORAL ONCE
Status: COMPLETED | OUTPATIENT
Start: 2024-06-10 | End: 2024-06-10

## 2024-06-10 RX ORDER — ONDANSETRON 2 MG/ML
4 INJECTION INTRAMUSCULAR; INTRAVENOUS ONCE
Status: COMPLETED | OUTPATIENT
Start: 2024-06-10 | End: 2024-06-10

## 2024-06-11 NOTE — ED PROVIDER NOTES
Patient Seen in: [facility]      History     Chief Complaint   Patient presents with    Numbness Weakness     Stated Complaint: numbness in right thumb and index finger, began two weeks ago, CA hx and chemo    Subjective:   Patient is a 53-year-old female with history of breast cancer who is been through chemotherapy in the past who presents emergency room for paresthesias in her right hand.  Patient is having the symptoms for the last 3 weeks.  She contacted her doctor who advised her to come to the emergency room for rule out stroke.  Patient has sensation intact range of motion is intact she feels subjectively that she has decreased range of motion of the right thumb however range of motion intact she does report difficulty with fine motor control.  She has had intermittent paresthesias in the past which was attributed to her chemotherapy.  Patient is very anxious about getting MRI.    The history is provided by the patient and the spouse.           Objective:   No pertinent past medical history.            No pertinent past surgical history.              No pertinent social history.            Review of Systems   Musculoskeletal:         Paresthesias right thumb and index finger   Psychiatric/Behavioral:  The patient is nervous/anxious.        Positive for stated complaint: numbness in right thumb and index finger, began two weeks ago, CA hx and chemo  Other systems are as noted in HPI.  Constitutional and vital signs reviewed.      All other systems reviewed and negative except as noted above.    Physical Exam     ED Triage Vitals [06/10/24 2119]   /80   Pulse 105   Resp 20   Temp 97.6 °F (36.4 °C)   Temp src Temporal   SpO2 94 %   O2 Device None (Room air)       Current Vitals:   Vital Signs  BP: 139/79  Pulse: 92  Resp: 19  Temp: 97.6 °F (36.4 °C)  Temp src: Temporal  MAP (mmHg): 96    Oxygen Therapy  SpO2: 99 %  O2 Device: None (Room air)            Physical Exam  Vitals and  nursing note reviewed.   Constitutional:       General: She is not in acute distress.     Appearance: She is well-developed. She is obese. She is not ill-appearing or toxic-appearing.   HENT:      Head: Normocephalic and atraumatic.   Cardiovascular:      Rate and Rhythm: Normal rate and regular rhythm.      Heart sounds: Normal heart sounds.   Pulmonary:      Effort: Pulmonary effort is normal.   Abdominal:      General: Bowel sounds are normal.      Palpations: Abdomen is soft.   Musculoskeletal:         General: No swelling or tenderness. Normal range of motion.      Cervical back: Normal range of motion and neck supple.      Comments: Normal range of motion sensation intact good cap refill negative Phalen's test   Skin:     General: Skin is warm.   Neurological:      Mental Status: She is alert and oriented to person, place, and time.      GCS: GCS eye subscore is 4. GCS verbal subscore is 5. GCS motor subscore is 6.      Cranial Nerves: No cranial nerve deficit or dysarthria.      Sensory: No sensory deficit.   Psychiatric:         Mood and Affect: Mood normal.         Behavior: Behavior normal.               ED Course     Labs Reviewed   COMP METABOLIC PANEL (14) - Abnormal; Notable for the following components:       Result Value    Glucose 138 (*)     Creatinine 1.04 (*)     All other components within normal limits   MAGNESIUM - Normal   CBC WITH DIFFERENTIAL WITH PLATELET    Narrative:     The following orders were created for panel order CBC With Differential With Platelet.  Procedure                               Abnormality         Status                     ---------                               -----------         ------                     CBC W/ DIFFERENTIAL[[id number]]                              Final result                 Please view results for these tests on the individual orders.   RAINBOW DRAW LAVENDER   RAINBOW DRAW LIGHT GREEN   RAINBOW DRAW BLUE   CBC W/ DIFFERENTIAL        CT BRAIN OR  HEAD ([id number])    Result Date: 6/10/2024  PROCEDURE:  CT BRAIN OR HEAD (17030)  COMPARISON:  None.  INDICATIONS:  numbness in right thumb and index finger, began two weeks ago, CA hx and chemo  TECHNIQUE:  Noncontrast CT scanning is performed through the brain. Dose reduction techniques were used. Dose information is transmitted to the ACR (American College of Radiology) NRDR (National Radiology Data Registry) which includes the Dose Index Registry.  PATIENT STATED HISTORY: (As transcribed by Technologist)  right hand weakness and numbness.    FINDINGS:  The ventricles are normal in size and configuration. There is no evidence of hemorrhage, mass, midline shift, or extra-axial fluid collection.  The visualized paranasal sinuses show no significant sinus disease. No evidence of depressed skull fracture.            CONCLUSION: No acute intracranial findings.    LOCATION:  [facility]   Dictated by (CST): Jacobo Molina MD on 6/10/2024 at 10:31 PM     Finalized by (CST): Jacobo Molina MD on 6/10/2024 at 10:34 PM        MRI shows no acute infarct hemorrhage or hydrocephalus or herniation a few scattered foci of T2 hyperintensity in the periventricular and deep white matter are nonspecific and could be postinfectious/inflammatory in etiology, represent the sequela of microvascular ischemic change or possibly be treatment related given history of chemotherapy.  If this persist, clinical concern for brain metastases contrast-enhanced sequences should be considered.              MDM      Social -negative tobacco, negative etoh, negative drugs  Family History-noncontributory  Past Medical History-breast cancer, DVT, pulmonary embolism, kidney stone, colon cancer, anxiety    Differential diagnosis before testing included paresthesias, peripheral neuropathy secondary to chemotherapy, electrolyte abnormality, TIA, CVA    Co-morbidities that add to the complexity of management include: History of chemotherapy for breast  cancer    Testing ordered during this visit included CT scan of the brain MRI of the brain baseline labs    Radiographic images  I personally reviewed the radiographs and my individual interpretation shows MRI of the brain shows no acute process CT scan shows no acute process  I also reviewed the official reports that showed  CT BRAIN OR HEAD ([id number])    Result Date: 6/10/2024  PROCEDURE:  CT BRAIN OR HEAD (30236)  COMPARISON:  None.  INDICATIONS:  numbness in right thumb and index finger, began two weeks ago, CA hx and chemo  TECHNIQUE:  Noncontrast CT scanning is performed through the brain. Dose reduction techniques were used. Dose information is transmitted to the ACR (American College of Radiology) NRDR (National Radiology Data Registry) which includes the Dose Index Registry.  PATIENT STATED HISTORY: (As transcribed by Technologist)  right hand weakness and numbness.    FINDINGS:  The ventricles are normal in size and configuration. There is no evidence of hemorrhage, mass, midline shift, or extra-axial fluid collection.  The visualized paranasal sinuses show no significant sinus disease. No evidence of depressed skull fracture.            CONCLUSION: No acute intracranial findings.    LOCATION:  [facility]   Dictated by (CST): Jacobo Molina MD on 6/10/2024 at 10:31 PM     Finalized by (CST): Jacobo Molina MD on 6/10/2024 at 10:34 PM        MRI shows no acute infarct hemorrhage or hydrocephalus or herniation a few scattered foci of T2 hyperintensity in the periventricular and deep white matter are nonspecific and could be postinfectious/inflammatory in etiology, represent the sequela of microvascular ischemic change or possibly be treatment related given history of chemotherapy.  If this persist, clinical concern for brain metastases contrast-enhanced sequences should be considered.    External chart review showed review of care everywhere in [facility] shows patient had MRI of the breast earlier  today.    History obtained by an independent source included from patient, family    Discussion of management with patient, family    Social determinants of health that affect care include patient very nervous      Medications Provided: Ativan, Benadryl    Course of Events during Emergency Room Visit include 33-year-old female presents emergency room with paresthesias for the last 3 weeks in her right hand.  Low suspicion for carpal tunnel given negative Phalen's test however still remote possibility medication induced from chemotherapy secondary peripheral neuropathy is highest on my differential however we will get a CT scan of her brain which was normal.  MRI will be obtained.  If negative plan for discharge home with follow-up with primary care physician          Disposition:        Discharge  I have discussed with the patient the results of test, differential diagnosis, treatment plan, warning signs and symptoms which should prompt immediate return.  They expressed understanding of these instructions and agrees to the following plan provided.  They were given written discharge instructions and agrees to return for any concerns and voiced understanding and all questions were answered.                                      Medical Decision Making      Disposition and Plan     Clinical Impression:  1. Paresthesias    2. Renal insufficiency         Disposition:  Discharge  6/11/2024  1:05 am    Follow-up:  Fabian Reid DO  [address] [place] [address]  [phone]    Schedule an appointment as soon as possible for a visit            Medications Prescribed:  Discharge Medication List as of 6/11/2024  1:11 AM

## 2024-06-11 NOTE — ED INITIAL ASSESSMENT (HPI)
Pt c/o numbness, tingling, weakness to the R thumb and R index finger for about 1-2 wks. States weakness felt more when trying to , pinch or grasp something. No drifts to arms and legs noted. Pt ambulatory, steady gait. Speech clear, no facial droop noted. Hand grasps strong, equal bilaterally.     Hx Breast CA, finished tx in 2022, \"finished treatment for Colon CA in 2023.\"

## 2024-06-21 ENCOUNTER — TELEPHONE (OUTPATIENT)
Facility: CLINIC | Age: 54
End: 2024-06-21

## 2024-06-21 DIAGNOSIS — M79.641 RIGHT HAND PAIN: Primary | ICD-10-CM

## 2024-06-21 NOTE — TELEPHONE ENCOUNTER
Patient has an appointment scheduled with Kya Shafer on 7/22/24 for right thumb-index finger pain,numbness, and weakness radiating down to wrist. Please advise if imaging is needed prior.

## 2024-07-22 ENCOUNTER — OFFICE VISIT (OUTPATIENT)
Dept: ORTHOPEDICS CLINIC | Facility: CLINIC | Age: 54
End: 2024-07-22
Payer: COMMERCIAL

## 2024-07-22 ENCOUNTER — HOSPITAL ENCOUNTER (OUTPATIENT)
Dept: GENERAL RADIOLOGY | Age: 54
Discharge: HOME | End: 2024-07-22
Attending: PHYSICIAN ASSISTANT
Payer: COMMERCIAL

## 2024-07-22 VITALS — WEIGHT: 206 LBS | BODY MASS INDEX: 38.89 KG/M2 | HEIGHT: 61 IN

## 2024-07-22 DIAGNOSIS — G56.01 CARPAL TUNNEL SYNDROME OF RIGHT WRIST: Primary | ICD-10-CM

## 2024-07-22 DIAGNOSIS — M79.641 RIGHT HAND PAIN: ICD-10-CM

## 2024-07-22 PROCEDURE — 3008F BODY MASS INDEX DOCD: CPT | Performed by: PHYSICIAN ASSISTANT

## 2024-07-22 PROCEDURE — 73130 X-RAY EXAM OF HAND: CPT | Performed by: PHYSICIAN ASSISTANT

## 2024-07-22 PROCEDURE — 99203 OFFICE O/P NEW LOW 30 MIN: CPT | Performed by: PHYSICIAN ASSISTANT

## 2024-07-22 NOTE — H&P
Clinic Note [facility]     Assessment/Plan:  53 year old female    Right hand numbness and tingling possible carpal tunnel syndrome-I would like to start with a course of nighttime bracing over the next 4 weeks if there is no improvement or worsening in symptoms I recommend an EMG.  Right thumb contracture to abduction-she is stiff to even passive abduction of the thumb compared to contralateral side.  Not sure if this is related to any nerve issue.  We will monitor this    No diagnosis found.     Follow Up: 4 to 6 weeks    Diagnostic Studies:  X-rays are negative for acute fractures occasional deformity    Physical Exam:    Ht 5' 1\" (1.549 m)   Wt 206 lb (93.4 kg)   LMP 05/31/2023 (Approximate)   BMI 38.92 kg/m²     Constitutional: NAD. AOx3. Well-developed and Well-nourished.   Psychiatric: Normal mood/ affect/ behavior. Judgment and thought content normal.     Right upper Extremity:   Inspection: Skin Intact. No skin lesions. No gross deformity.   Palpation: Nontender palpation over hand and wrist   Motion: Elbow: normal bilateral symmetric ext/flex  Wrist: normal bilateral symmetric ext/flex/sup/pro  Finger: full composite fist     Sensory: Positive median nerve compression test.  Negative Tinel's.  No APB weakness or thenar atrophy.    CC: Hand Pain (RT THUMB/HAND NUMBNESS AND WEAKNESS; ONSET: 1 MONTH AGO)        HPI: This 53 year old female presents with complaints of thumb and index finger numbness and tingling ongoing about 6 weeks on the right side.  She does have significant weakness when she is trying to  and grasp.  She also notices the inability to fully abduct the thumb compared to her left side.  She will wake up in the melanite with complete numbness and tingling affecting the whole hand.    @collapsablehistory@      Past Medical History:    Anxiety    Breast cancer (HCC)    Calculus of kidney    Class 1 obesity due to excess calories without serious comorbidity with body mass index  (BMI) of 33.0 to 33.9 in adult    Colon cancer (HCC)    Deep vein thrombosis (HCC)    Exposure to medical diagnostic radiation    Pulmonary embolism (HCC)    Usual Ductal hyperplasia of breast    Breast Biopsy    Visual impairment    glasses       Past Surgical History:   Procedure Laterality Date    Breast biopsy  2013     Left- benign calcification          Closed rx shldr dislocation      Cystoscopy,insert ureteral stent      Needle biopsy left  3/2013    Fibro Fatty Breast Tissue, Microcalcifications, No evidence of malignancy    Needle biopsy left  2016    3:00 stereotatic biopsy yielded fibrocystic changes.    Other surgical history  2021    R breast needle loc lumpectomy    Other surgical history  2022    rexcision R breast lumpectomy, SLN bx    Removal of kidney stone  2017    Throat surgery procedure unlisted  1994    Benign nodule removed       Current Outpatient Medications   Medication Sig Dispense Refill    anastrozole 1 MG Oral Tab tab Take 1 tablet (1 mg total) by mouth daily.         No Known Allergies    Family History   Problem Relation Age of Onset    Breast Cancer Maternal Aunt 50        age at dx 50    Cancer Father 62        Multiple Myeloma    Hypertension Father     Cancer Maternal Grandmother         Skin cancer    Dementia Maternal Grandmother     Other (Osteoporosis) Maternal Grandmother     Cancer Paternal Grandmother         Skin cancer    High Cholesterol Paternal Grandmother     Cancer Mother 64        Lung Cancer, smoker    Other (Osteoporosis) Mother     Diabetes Maternal Aunt        Social History     Occupational History    Not on file   Tobacco Use    Smoking status: Never    Smokeless tobacco: Never   Vaping Use    Vaping status: Never Used   Substance and Sexual Activity    Alcohol use: Not Currently     Comment: rarely    Drug use: No    Sexual activity: Yes     Partners: Male     Birth control/protection: Condom, Vasectomy        Review of  Systems (negative unless bolded):  General: fevers, chills, fatigue  CV:  chest pain, palpitations, leg swelling  Msk: bodyaches, neck pain, neck stiffness  Skin: rashes, open wounds, nonhealing ulcers  Hem: bleeds easily, bruise easily, immunocompromised  Neuro: dizziness, light headedness, headaches  Psych: anxious, depressed, anger issues  Kya Shafer PA-C  Hand, Wrist, & Elbow Surgery  Physician Assistant to Dr. Mike [email or web address]  t: [phone]  f: [phone]

## 2024-08-05 ENCOUNTER — TELEPHONE (OUTPATIENT)
Dept: GENETICS | Facility: HOSPITAL | Age: 54
End: 2024-08-05

## 2024-08-05 NOTE — TELEPHONE ENCOUNTER
Returned call from Shannan as she is inquiring whether she should be tested for Childers syndrome given her colon ca dx shortly after her breast cancer diagnosis. Her genetic testing only covered 9 breast-focused genes. Explained she certainly qualifies for additional testing but encouraged her to see if Childers screening via pathology (MSI or IHC) were performed on her colon tumor. She will reach out to her care team to determine whether this was performed and will then reach back to me to discuss considerations for genetic testing.

## 2024-09-30 ENCOUNTER — OFFICE VISIT (OUTPATIENT)
Dept: ORTHOPEDICS CLINIC | Facility: CLINIC | Age: 54
End: 2024-09-30
Payer: COMMERCIAL

## 2024-09-30 VITALS — WEIGHT: 206 LBS | BODY MASS INDEX: 38.89 KG/M2 | HEIGHT: 61 IN

## 2024-09-30 DIAGNOSIS — G56.01 CARPAL TUNNEL SYNDROME OF RIGHT WRIST: Primary | ICD-10-CM

## 2024-09-30 PROCEDURE — 99212 OFFICE O/P EST SF 10 MIN: CPT | Performed by: PHYSICIAN ASSISTANT

## 2024-09-30 PROCEDURE — 3008F BODY MASS INDEX DOCD: CPT | Performed by: PHYSICIAN ASSISTANT

## 2024-09-30 NOTE — PROGRESS NOTES
Clinic Note [facility]     Assessment/Plan:  54 year old female    Right hand numbness and tingling possible carpal tunnel syndrome-improvements with nighttime bracing so she will continue with that if her symptoms persist or worsen she will contact me.  Right thumb contracture to abduction-she is stiff to even passive abduction of the thumb compared to contralateral side.  This has improved with bracing so this could be related to her carpal tunnel which we discussed today.    No diagnosis found.     Follow Up: As needed    Diagnostic Studies:  X-rays are negative for acute fractures occasional deformity    Physical Exam:    Ht 5' 1\" (1.549 m)   Wt 206 lb (93.4 kg)   LMP 05/31/2023 (Approximate)   BMI 38.92 kg/m²     Constitutional: NAD. AOx3. Well-developed and Well-nourished.   Psychiatric: Normal mood/ affect/ behavior. Judgment and thought content normal.     Right upper Extremity:   Inspection: Skin Intact. No skin lesions. No gross deformity.   Palpation: Nontender palpation over hand and wrist   Motion: Elbow: normal bilateral symmetric ext/flex  Wrist: normal bilateral symmetric ext/flex/sup/pro  Finger: full composite fist     Sensory: Positive median nerve compression test.  Negative Tinel's.  No APB weakness on exam, but difficulty with abduction of the thumb.    CC: Follow - Up (RT HAND NUMBNESS; STATES SHE IS DOING BETTER )        HPI: This 54 year old female presents with complaints of thumb and index finger numbness and tingling ongoing about 6 weeks on the right side.  She does have significant weakness when she is trying to  and grasp.  She also notices the inability to fully abduct the thumb compared to her left side.  She will wake up in the melanite with complete numbness and tingling affecting the whole hand.    Interval history: Patient's been bracing at night full-time and her symptoms have improved.      Past Medical History:    Anxiety    Breast cancer (HCC)    Calculus of  kidney    Class 1 obesity due to excess calories without serious comorbidity with body mass index (BMI) of 33.0 to 33.9 in adult    Colon cancer (HCC)    Deep vein thrombosis (HCC)    Exposure to medical diagnostic radiation    Pulmonary embolism (HCC)    Usual Ductal hyperplasia of breast    Breast Biopsy    Visual impairment    glasses       Past Surgical History:   Procedure Laterality Date    Breast biopsy  2013     Left- benign calcification          Closed rx shldr dislocation      Cystoscopy,insert ureteral stent      Needle biopsy left  3/2013    Fibro Fatty Breast Tissue, Microcalcifications, No evidence of malignancy    Needle biopsy left  2016    3:00 stereotatic biopsy yielded fibrocystic changes.    Other surgical history  2021    R breast needle loc lumpectomy    Other surgical history  2022    rexcision R breast lumpectomy, SLN bx    Removal of kidney stone  2017    Throat surgery procedure unlisted  1994    Benign nodule removed       Current Outpatient Medications   Medication Sig Dispense Refill    anastrozole 1 MG Oral Tab tab Take 1 tablet (1 mg total) by mouth daily.         No Known Allergies    Family History   Problem Relation Age of Onset    Breast Cancer Maternal Aunt 50        age at dx 50    Cancer Father 62        Multiple Myeloma    Hypertension Father     Cancer Maternal Grandmother         Skin cancer    Dementia Maternal Grandmother     Other (Osteoporosis) Maternal Grandmother     Cancer Paternal Grandmother         Skin cancer    High Cholesterol Paternal Grandmother     Cancer Mother 64        Lung Cancer, smoker    Other (Osteoporosis) Mother     Diabetes Maternal Aunt        Social History     Occupational History    Not on file   Tobacco Use    Smoking status: Never    Smokeless tobacco: Never   Vaping Use    Vaping status: Never Used   Substance and Sexual Activity    Alcohol use: Not Currently     Comment: rarely    Drug use: No    Sexual  activity: Yes     Partners: Male     Birth control/protection: Condom, Vasectomy        Review of Systems (negative unless bolded):  General: fevers, chills, fatigue  CV:  chest pain, palpitations, leg swelling  Msk: bodyaches, neck pain, neck stiffness  Skin: rashes, open wounds, nonhealing ulcers  Hem: bleeds easily, bruise easily, immunocompromised  Neuro: dizziness, light headedness, headaches  Psych: anxious, depressed, anger issues  Kya Shafer PA-C  Hand, Wrist, & Elbow Surgery  Physician Assistant to Dr. [email or web address]  t: [phone]  f: [phone]

## 2024-12-09 NOTE — ED PROVIDER NOTES
Patient Seen in: [facility] Emergency Department      History     Chief Complaint   Patient presents with    Abdomen/Flank Pain     Right side abd. pain     Stated Complaint: Right side abd. pain    Subjective:   HPI    Patient is a 53-year-old female presents emergency room with a history of right-sided abdominal pain and some pain with deep breathing that has been ongoing over the last several days.  The patient's pain has been well localized to the right side of the abdomen without radiation.  Patient denies history of any urinary complaints.  The patient denies history of any fever.  But the patient denies history of any shortness of breath.  Patient does have a history of a previous blood clot a little over a year ago for which she was on blood thinners but is not currently on blood thinners.  Patient denies history of any left-sided abdominal pain.  Patient denies history of any vomiting or diarrhea.  Patient has been able to move her bowels at home.    Objective:   Past Medical History:   Diagnosis Date    Anxiety     Breast cancer (HCC)     Calculus of kidney     Class 1 obesity due to excess calories without serious comorbidity with body mass index (BMI) of 33.0 to 33.9 in adult 2019    Colon cancer (HCC)     Deep vein thrombosis (HCC) 2022    Exposure to medical diagnostic radiation 2022    Pulmonary embolism (HCC)     Usual Ductal hyperplasia of breast 2016    Breast Biopsy    Visual impairment     glasses              Past Surgical History:   Procedure Laterality Date    BREAST BIOPSY  2013     Left- benign calcification          CLOSED RX SHLDR DISLOCATION      CYSTOSCOPY,INSERT URETERAL STENT      NEEDLE BIOPSY LEFT  3/2013    Fibro Fatty Breast Tissue, Microcalcifications, No evidence of malignancy    NEEDLE BIOPSY LEFT  2016    3:00 stereotatic biopsy yielded fibrocystic changes.    OTHER SURGICAL HISTORY  2021    R breast needle loc lumpectomy     OTHER SURGICAL HISTORY  01/28/2022    rexcision R breast lumpectomy, SLN bx    REMOVAL OF KIDNEY STONE  12/2017    THROAT SURGERY PROCEDURE UNLISTED  12/1994    Benign nodule removed                Social History     Socioeconomic History    Marital status:    Tobacco Use    Smoking status: Never    Smokeless tobacco: Never   Vaping Use    Vaping Use: Never used   Substance and Sexual Activity    Alcohol use: Not Currently     Comment: rarely    Drug use: No    Sexual activity: Yes     Partners: Male     Birth control/protection: Condom, Vasectomy   Other Topics Concern    Seat Belt Yes              Review of Systems    Positive for stated complaint: Right side abd. pain  Other systems are as noted in HPI.  Constitutional and vital signs reviewed.      All other systems reviewed and negative except as noted above.    Physical Exam     ED Triage Vitals [01/23/24 0846]   /87   Pulse (!) 124   Resp 16   Temp 98 °F (36.7 °C)   Temp src Temporal   SpO2 94 %   O2 Device None (Room air)       Current:/77   Pulse 109   Temp 98 °F (36.7 °C) (Temporal)   Resp 13   Ht 152.4 cm (5')   Wt 90.7 kg   LMP 05/31/2023 (Approximate)   SpO2 100%   BMI 39.06 kg/m²         Physical Exam    GENERAL: Well-developed, well-nourished female sitting up breathing easily in no apparent distress.  Patient is nontoxic in appearance.  HEENT: Head is normocephalic, atraumatic. Pupils are 4 mm equally round and reactive to light. Oropharynx is clear. Mucous membranes are moist.  LUNGS: Clear to auscultation bilaterally with no wheeze. There is good equal air entry bilaterally.  HEART: Regular rhythm with a tachycardic rate. Normal S1, S2 no S3, or S4. No murmur.  ABDOMEN: There is mild focal tenderness to palpation appreciated to the right mid abdomen only with no other focal tenderness to palpation appreciated. There is no guarding, no rebound, no mass, and no organomegaly appreciated. There is normoactive bowel sounds.  There is no hernia.  EXTREMITIES: There is no cyanosis, clubbing, or edema appreciated. Pulses are 2+ and equal in all 4 extremities.  NEURO: Patient is awake, alert and oriented to time place and person. Motor strength is 5 over 5 in all 4 extremities. There are no gross motor or sensory deficits appreciated.  Patient is up and ambulatory in the ER and answering all questions appropriately.        ED Course     Labs Reviewed   COMP METABOLIC PANEL (14) - Abnormal; Notable for the following components:       Result Value    Glucose 129 (*)     Alkaline Phosphatase 118 (*)     Total Protein 8.4 (*)     All other components within normal limits   CBC W/ DIFFERENTIAL - Abnormal; Notable for the following components:    PLT 77.0 (*)     All other components within normal limits   LIPASE - Normal   PROTHROMBIN TIME (PT) - Normal   PTT, ACTIVATED - Normal   POCT PREGNANCY URINE - Normal   CBC WITH DIFFERENTIAL WITH PLATELET    Narrative:     The following orders were created for panel order CBC With Differential With Platelet.  Procedure                               Abnormality         Status                     ---------                               -----------         ------                     CBC W/ DIFFERENTIAL[[id number]]          Abnormal            Final result                 Please view results for these tests on the individual orders.   URINALYSIS WITH CULTURE REFLEX   RAINBOW DRAW LAVENDER   RAINBOW DRAW LIGHT GREEN   RAINBOW DRAW BLUE     EKG    Rate, intervals and axes as noted on EKG Report.  Rate: 111  Rhythm: Sinus Rhythm  Reading: No acute ST elevation appreciated.                 CTA CHEST + CT ABD (W) + CT PEL (W) (CPT=71275/66303)    Result Date: 1/23/2024  CONCLUSION:   1. No CT evidence of acute pulmonary embolism or acute aortic dissection.  2. There is an area of mild-to-moderate inflammatory changes so shaded with fat in the right upper abdomen abutting the inferior right hepatic lobe and  hepatic flexure of the colon.  This is best seen on image 48 series 11. Differential considerations would include an evolving omental infarct, changes related to nonspecific colitis, with other etiologies not entirely excluded.  Clinical correlation recommended along with follow-up imaging to confirm resolution/stability.   3. There is moderate colonic wall thickening with fatty induration along the descending colon and sigmoid colon that is concerning for nonspecific colitis.  Clinical correlation recommended.  4. Urinary bladder wall thickening is concerning for cystitis.  Clinical correlation recommended.  5. Mild nonspecific prominence of the bilateral ureters with ascending UTI or vesicoureteral reflux not entirely excluded.  Clinical correlation recommended.  6. Left nephrolithiasis.  No obstructive obstructing stone identified.  7. Probable calcified appendicoliths at the base of the appendix measuring up to 16 mm.  No CT evidence of acute appendicitis.  8. Diffuse fatty infiltration of the liver.  9. Stable 5 mm noncalcified pulmonary nodule along the left major fissure. Followup as per Fleischner criteria is suggested.  Please see above for further details.  LOCATION:  [facility]   Dictated by (CST): Cuong Parks MD on 1/23/2024 at 10:52 AM     Finalized by (CST): Cuong Parks MD on 1/23/2024 at 10:59 AM             MDM          12:11 patient sitting back and breathing easily in no apparent distress this time.  Patient's case discussed with Dr. Amaya, Dr. Blackburn as well as Dr. Camargo heme-onc on-call for the patient.  Patient CT report was reviewed by Dr. Amaya and images and report reviewed by Dr. Blackburn.  Dr. Blackburn did not feel the need for admission to the hospital at this time felt the patient could be discharged home with pain medication and close follow-up.  Patient does have evidence of thrombocytopenia however as per my review of outpatient labs from [facility] from September and October of last  year the patient's thrombocytopenia is identical to what it was previously.  She has been following up with Dr. Duran for the symptoms.    Patient had an IV line established and blood was drawn here including a CBC, chemistries, BUN/creatinine, and blood sugar which showed evidence of a platelet count of 77,000 which is low which is similar to what the patient has had previously with outpatient labs that were done at [facility] in the fall of last year which at that time were similar to what the patient had for platelet count today.  The patient did have liver function test that are unremarkable findings as noted negative.  Urinalysis found to be unremarkable.  Coags are unremarkable.  Differential diagnosis considered myself includes acute cholecystitis, acute appendicitis, acute ureterolithiasis.  Patient with CT scan results as noted above.  Patient CT results were discussed with GI, general surgery, and heme-onc as noted above.  Patient's imaging was reviewed and reports were reviewed by GI and general surgery as noted above.  General surgery did not feel the need for admission to the hospital at this time.  Treatment options were discussed with the patient including admission to the hospital and the patient felt comfortable going home at this time and will take Tylenol for pain at this time and close follow-up with Dr. Copeland and Dr. Wade as per GI and surgery request.  Patient has been instructed to encourage a bland diet at home.  General surgery, Dr. Blackburn states there would be no need for any other emergent treatment or care to be done at this time based on CT results and images at this time.  Patient was discharged home as per discussion with GI and general surgery and instructions to have the patient monitor symptoms closely at home and return to the ER immediately if symptoms worsen or if any other problems arise.  Patient discharged home at this time                               Medical Decision  Making      Disposition and Plan     Clinical Impression:  1. Abdominal pain, right upper quadrant    2. Thrombocytopenia (HCC)         Disposition:  Discharge  1/23/2024 12:24 pm    Follow-up:  Fabian Reid DO  [address]  [place] [address] [phone]    Follow up in 2 day(s)      Magdi Copeland MD  [address] [facility] [address]  [facility] [address]  [phone]    Follow up in 2 day(s)      May Chery MD  [address]  [facility] [id number] [phone]    Follow up in 2 day(s)            Medications Prescribed:  Discharge Medication List as of 1/23/2024 12:25 PM                                          Warm/Dry

## 2024-12-10 NOTE — LETTER
Luz Morrison [address] [facility] [address] [facility]  Authorization for Surgical Operation and Procedure   Date:_12/27/2022_                                                                                                         Time:_1150__  1. I hereby Shamikanga Molina*, my physician and his/her assistants (if applicable), which may include medical students, residents, and/or fellows, to perform the following surgical operation/ procedure and administer such anesthesia as may be determined necessary by my physician:  Operation/Procedure name (s) Robotic Asisxted Laparoscopic Extended Right Hemicolectomy, possible open on Maye Matter   2. I recognize that during the surgical operation/procedure, unforeseen conditions may necessitate additional or different procedures than those listed above. I, therefore, further authorize and request that the above-named surgeon, assistants, or designees perform such procedures as are, in their judgment, necessary and desirable. 3.   My surgeon/physician has discussed prior to my surgery the potential benefits, risks and side effects of this procedure; the likelihood of achieving goals; and potential problems that might occur during recuperation. They also discussed reasonable alternatives to the procedure, including risks, benefits, and side effects related to the alternatives and risks related to not receiving this procedure. I have had all my questions answered and I acknowledge that no guarantee has been made as to the result that may be obtained. 4.   Should the need arise during my operation or immediate post-operative period, I also consent to the administration of blood and/or blood products.   Further, I understand that despite careful testing and screening of blood or blood products by collecting agencies, I may still be subject to ill effects as a result of receiving a blood transfusion and/or blood products. The following are some, but not all, of the potential risks that can occur: fever and allergic reactions, hemolytic reactions, transmission of diseases such as Hepatitis, AIDS and Cytomegalovirus (CMV) and fluid overload. In the event that I wish to have an autologous transfusion of my own blood, or a directed donor transfusion. I will discuss this with my physician. 5.   I authorize the use of any specimen, organs, tissues, body parts or foreign objects that may be removed from my body during the operation/procedure for diagnosis, research or teaching purposes and their subsequent disposal by hospital authorities. I also authorize the release of specimen test results and/or written reports to my treating physician on the hospital medical staff or other referring or consulting physicians involved in my care, at the discretion of the Pathologist or my treating physician. 6.   I consent to the photographing or videotaping of the operations or procedures to be performed, including appropriate portions of my body for medical, scientific, or educational purposes, provided my identity is not revealed by the pictures or by descriptive texts accompanying them. If the procedure has been photographed/videotaped, the surgeon will obtain the original picture, image, videotape or CD. The hospital will not be responsible for storage, release or maintenance of the picture, image, tape or CD.    7.   I consent to the presence of a  or observers in the operating room as deemed necessary by my physician or their designees. 8.   I recognize that in the event my procedure results in extended X-Ray/fluoroscopy time, I may develop a skin reaction. 9.  If I have a Do Not Attempt Resuscitation (DNAR) order in place, that status will be suspended while in the operating room, procedural suite, and during the recovery period unless otherwise explicitly stated by me (or a person authorized to consent on my behalf). The surgeon or my attending physician will determine when the applicable recovery period ends for purposes of reinstating the DNAR order. 10. Patients having a sterilization procedure: I understand that if the procedure is successful the results will be permanent and it will therefore be impossible for me to inseminate, conceive, or bear children. I also understand that the procedure is intended to result in sterility, although the result has not been guaranteed. 11. I acknowledge that my physician has explained sedation/analgesia administration to me including the risk and benefits I consent to the administration of sedation/analgesia as may be necessary or desirable in the judgment of my physician.     I CERTIFY THAT I HAVE READ AND FULLY UNDERSTAND THE ABOVE CONSENT TO OPERATION and/or OTHER PROCEDURE.      _________________________________________  __________________________________  Signature of Patient     Signature of Responsible Person         ___________________________________         Printed Name of Responsible Person           _________________________________                 Relationship to Patient  _________________________________________  ______________________________  Signature of Witness          Date  Time    Patient Name: Nicholas Woo     : 1970                 Printed: 2022     Medical Record #: [id number]                     Page 1 of 1 [Negative] : Heme/Lymph [[id number]] : low back pain

## 2025-08-09 ENCOUNTER — APPOINTMENT (OUTPATIENT)
Dept: CT IMAGING | Facility: HOSPITAL | Age: 55
End: 2025-08-09
Attending: EMERGENCY MEDICINE

## 2025-08-09 ENCOUNTER — HOSPITAL ENCOUNTER (EMERGENCY)
Facility: HOSPITAL | Age: 55
Discharge: HOME | End: 2025-08-09
Attending: EMERGENCY MEDICINE

## 2025-08-09 ENCOUNTER — APPOINTMENT (OUTPATIENT)
Dept: GENERAL RADIOLOGY | Facility: HOSPITAL | Age: 55
End: 2025-08-09
Attending: EMERGENCY MEDICINE

## 2025-08-09 VITALS
DIASTOLIC BLOOD PRESSURE: 86 MMHG | OXYGEN SATURATION: 99 % | BODY MASS INDEX: 41 KG/M2 | HEART RATE: 98 BPM | SYSTOLIC BLOOD PRESSURE: 124 MMHG | RESPIRATION RATE: 15 BRPM | TEMPERATURE: 98 F | WEIGHT: 215 LBS

## 2025-08-09 DIAGNOSIS — R07.89 CHEST PAIN, ATYPICAL: Primary | ICD-10-CM

## 2025-08-09 LAB
ALBUMIN SERPL-MCNC: 4.9 G/DL (ref 3.2–4.8)
ALBUMIN/GLOB SERPL: 1.7 (ref 1–2)
ALP LIVER SERPL-CCNC: 83 U/L (ref 46–118)
ALT SERPL-CCNC: 34 U/L (ref 10–49)
ANION GAP SERPL CALC-SCNC: 11 MMOL/L (ref 0–18)
AST SERPL-CCNC: 28 U/L (ref ?–34)
BASOPHILS # BLD AUTO: 0.01 X10(3) UL (ref 0–0.2)
BASOPHILS NFR BLD AUTO: 0.2 %
BILIRUB SERPL-MCNC: 0.6 MG/DL (ref 0.3–1.2)
BUN BLD-MCNC: 14 MG/DL (ref 9–23)
CALCIUM BLD-MCNC: 10 MG/DL (ref 8.7–10.6)
CHLORIDE SERPL-SCNC: 104 MMOL/L (ref 98–112)
CO2 SERPL-SCNC: 29 MMOL/L (ref 21–32)
CREAT BLD-MCNC: 0.92 MG/DL (ref 0.55–1.02)
D DIMER PPP FEU-MCNC: 0.73 UG/ML FEU (ref ?–0.56)
EGFRCR SERPLBLD CKD-EPI 2021: 73 ML/MIN/1.73M2 (ref 60–?)
EOSINOPHIL # BLD AUTO: 0.06 X10(3) UL (ref 0–0.7)
EOSINOPHIL NFR BLD AUTO: 1.2 %
ERYTHROCYTE [DISTWIDTH] IN BLOOD BY AUTOMATED COUNT: 12.3 %
GLOBULIN PLAS-MCNC: 2.9 G/DL (ref 2–3.5)
GLUCOSE BLD-MCNC: 94 MG/DL (ref 70–99)
HCT VFR BLD AUTO: 41.9 % (ref 35–48)
HGB BLD-MCNC: 14.6 G/DL (ref 12–16)
IMM GRANULOCYTES # BLD AUTO: 0.01 X10(3) UL (ref 0–1)
IMM GRANULOCYTES NFR BLD: 0.2 %
LYMPHOCYTES # BLD AUTO: 1.55 X10(3) UL (ref 1–4)
LYMPHOCYTES NFR BLD AUTO: 30.2 %
MCH RBC QN AUTO: 30.6 PG (ref 26–34)
MCHC RBC AUTO-ENTMCNC: 34.8 G/DL (ref 31–37)
MCV RBC AUTO: 87.8 FL (ref 80–100)
MONOCYTES # BLD AUTO: 0.4 X10(3) UL (ref 0.1–1)
MONOCYTES NFR BLD AUTO: 7.8 %
NEUTROPHILS # BLD AUTO: 3.11 X10 (3) UL (ref 1.5–7.7)
NEUTROPHILS # BLD AUTO: 3.11 X10(3) UL (ref 1.5–7.7)
NEUTROPHILS NFR BLD AUTO: 60.4 %
OSMOLALITY SERPL CALC.SUM OF ELEC: 298 MOSM/KG (ref 275–295)
PLATELET # BLD AUTO: 244 10(3)UL (ref 150–450)
POTASSIUM SERPL-SCNC: 3.8 MMOL/L (ref 3.5–5.1)
PROT SERPL-MCNC: 7.8 G/DL (ref 5.7–8.2)
RBC # BLD AUTO: 4.77 X10(6)UL (ref 3.8–5.3)
SODIUM SERPL-SCNC: 144 MMOL/L (ref 136–145)
TROPONIN I SERPL HS-MCNC: <3 NG/L (ref ?–34)
TROPONIN I SERPL HS-MCNC: <3 NG/L (ref ?–34)
WBC # BLD AUTO: 5.1 X10(3) UL (ref 4–11)

## 2025-08-09 PROCEDURE — 85025 COMPLETE CBC W/AUTO DIFF WBC: CPT | Performed by: EMERGENCY MEDICINE

## 2025-08-09 PROCEDURE — 71275 CT ANGIOGRAPHY CHEST: CPT | Performed by: EMERGENCY MEDICINE

## 2025-08-09 PROCEDURE — 85379 FIBRIN DEGRADATION QUANT: CPT | Performed by: EMERGENCY MEDICINE

## 2025-08-09 PROCEDURE — 71045 X-RAY EXAM CHEST 1 VIEW: CPT | Performed by: EMERGENCY MEDICINE

## 2025-08-09 PROCEDURE — 84484 ASSAY OF TROPONIN QUANT: CPT | Performed by: EMERGENCY MEDICINE

## 2025-08-09 PROCEDURE — 80053 COMPREHEN METABOLIC PANEL: CPT | Performed by: EMERGENCY MEDICINE

## 2025-08-09 PROCEDURE — 93005 ELECTROCARDIOGRAM TRACING: CPT

## 2025-08-09 PROCEDURE — 99285 EMERGENCY DEPT VISIT HI MDM: CPT

## 2025-08-09 PROCEDURE — 96374 THER/PROPH/DIAG INJ IV PUSH: CPT

## 2025-08-09 PROCEDURE — 93010 ELECTROCARDIOGRAM REPORT: CPT

## 2025-08-09 RX ORDER — KETOROLAC TROMETHAMINE 30 MG/ML
30 INJECTION, SOLUTION INTRAMUSCULAR; INTRAVENOUS ONCE
Status: DISCONTINUED | OUTPATIENT
Start: 2025-08-09 | End: 2025-08-09

## 2025-08-09 RX ORDER — ONDANSETRON 2 MG/ML
4 INJECTION INTRAMUSCULAR; INTRAVENOUS
Status: DISCONTINUED | OUTPATIENT
Start: 2025-08-09 | End: 2025-08-09

## 2025-08-09 RX ORDER — LIDOCAINE HYDROCHLORIDE 20 MG/ML
10 SOLUTION OROPHARYNGEAL ONCE
Status: COMPLETED | OUTPATIENT
Start: 2025-08-09 | End: 2025-08-09

## 2025-08-09 RX ORDER — FAMOTIDINE 20 MG/1
40 TABLET, FILM COATED ORAL ONCE
Status: COMPLETED | OUTPATIENT
Start: 2025-08-09 | End: 2025-08-09

## 2025-08-09 RX ORDER — MAGNESIUM HYDROXIDE/ALUMINUM HYDROXICE/SIMETHICONE 120; 1200; 1200 MG/30ML; MG/30ML; MG/30ML
30 SUSPENSION ORAL ONCE
Status: COMPLETED | OUTPATIENT
Start: 2025-08-09 | End: 2025-08-09

## 2025-08-10 LAB
ATRIAL RATE: 101 BPM
P AXIS: 63 DEGREES
P-R INTERVAL: 204 MS
Q-T INTERVAL: 358 MS
QRS DURATION: 82 MS
QTC CALCULATION (BEZET): 464 MS
R AXIS: 24 DEGREES
T AXIS: 21 DEGREES
VENTRICULAR RATE: 101 BPM

## (undated) DEVICE — LIGHT HANDLE

## (undated) DEVICE — SUT MONOCRYL 4-0 PS-2 Y496G

## (undated) DEVICE — SEAL

## (undated) DEVICE — ROBOTIC GENERAL: Brand: MEDLINE INDUSTRIES, INC.

## (undated) DEVICE — SUT SILK 2-0 A305H

## (undated) DEVICE — SUT VICRYL 0 CT-1 J946H

## (undated) DEVICE — CLOSURE EXOFIN 1.0ML

## (undated) DEVICE — #10 STERILE BLADE: Brand: POLYMER COATED BLADES

## (undated) DEVICE — FENESTRATED BIPOLAR FORCEPS: Brand: ENDOWRIST

## (undated) DEVICE — #11 STERILE BLADE: Brand: POLYMER COATED BLADES

## (undated) DEVICE — BLADELESS OBTURATOR: Brand: WECK VISTA

## (undated) DEVICE — ENDOPATH ULTRA VERESS INSUFFLATION NEEDLES WITH LUER LOCK CONNECTORS: Brand: ENDOPATH

## (undated) DEVICE — TIP-UP FENESTRATED GRASPER: Brand: ENDOWRIST

## (undated) DEVICE — WOUND RETRACTOR AND PROTECTOR: Brand: ALEXIS O WOUND PROTECTOR-RETRACTOR

## (undated) DEVICE — SUT VICRYL 3-0 SH J416H

## (undated) DEVICE — ARM DRAPE

## (undated) DEVICE — DRAPE,TOP,102X53,STERILE: Brand: MEDLINE

## (undated) DEVICE — C-ARM: Brand: UNBRANDED

## (undated) DEVICE — SUT SILK 3-0 SH K832H

## (undated) DEVICE — COLUMN DRAPE

## (undated) DEVICE — TIP COVER ACCESSORY

## (undated) DEVICE — CANNULA SEAL

## (undated) DEVICE — 2, DISPOSABLE SUCTION/IRRIGATOR WITHOUT DISPOSABLE TIP: Brand: STRYKEFLOW

## (undated) DEVICE — SUTURE VLOC 180 3-0 6" 0604

## (undated) DEVICE — DRESS WOUND AQUACEL 3.5INX6INL

## (undated) DEVICE — APPLICATOR CHLORAPREP 26ML

## (undated) DEVICE — LAPAROVUE VISIBILITY SYSTEM LAPAROSCOPIC SOLUTIONS: Brand: LAPAROVUE

## (undated) DEVICE — TRAY SURESTEP 16 BARDEX UMETR

## (undated) DEVICE — STAPLER 60 RELOAD BLUE: Brand: SUREFORM

## (undated) DEVICE — REDUCER: Brand: ENDOWRIST

## (undated) DEVICE — SUT VICRYL 0 J608H

## (undated) DEVICE — VIOLET BRAIDED (POLYGLACTIN 910), SYNTHETIC ABSORBABLE SUTURE: Brand: COATED VICRYL

## (undated) DEVICE — MONOPOLAR CURVED SCISSORS: Brand: ENDOWRIST

## (undated) DEVICE — STANDARD HYPODERMIC NEEDLE,POLYPROPYLENE HUB: Brand: MONOJECT

## (undated) DEVICE — SOL NACL IRRIG 0.9% 1000ML BTL

## (undated) DEVICE — SUT PROLENE 2-0 CT-2 8411H

## (undated) DEVICE — BREAST-HERNIA-PORT CDS-LF: Brand: MEDLINE INDUSTRIES, INC.

## (undated) DEVICE — MEGA SUTURECUT ND: Brand: ENDOWRIST

## (undated) DEVICE — STERILE SYNTHETIC POLYISOPRENE POWDER-FREE SURGICAL GLOVES WITH HYDROGEL COATING, SMOOTH FINISH, STRAIGHT FINGER: Brand: PROTEXIS

## (undated) DEVICE — STAPLER 60: Brand: SUREFORM

## (undated) DEVICE — 40580 - THE PINK PAD - ADVANCED TRENDELENBURG POSITIONING KIT: Brand: 40580 - THE PINK PAD - ADVANCED TRENDELENBURG POSITIONING KIT

## (undated) DEVICE — SLEEVE KENDALL SCD EXPRESS MED

## (undated) DEVICE — SUT PDS II 0 CT-1 Z346H

## (undated) DEVICE — VESSEL SEALER EXTEND: Brand: ENDOWRIST

## (undated) NOTE — LETTER
Date: 2/18/2021    Patient Name: Mary Nugent          To Whom it may concern: This letter has been written at the patient's request. The above patient was seen at one of the [facility] locations for treatment of a medical condition.

## (undated) NOTE — LETTER
Date: 4/21/2021    Patient Name: Kathy Shannon          To Whom it may concern: This letter has been written at the patient's request. The above patient was seen at one of the [facility] for treatment of a medical condition.